# Patient Record
Sex: MALE | Employment: FULL TIME | URBAN - METROPOLITAN AREA
[De-identification: names, ages, dates, MRNs, and addresses within clinical notes are randomized per-mention and may not be internally consistent; named-entity substitution may affect disease eponyms.]

---

## 2024-01-17 ENCOUNTER — EVALUATION (OUTPATIENT)
Dept: PHYSICAL THERAPY | Facility: CLINIC | Age: 34
End: 2024-01-17
Payer: OTHER MISCELLANEOUS

## 2024-01-17 DIAGNOSIS — Z98.890 S/P LUMBAR DISCECTOMY: Primary | ICD-10-CM

## 2024-01-17 DIAGNOSIS — G89.29 CHRONIC BILATERAL LOW BACK PAIN WITH LEFT-SIDED SCIATICA: ICD-10-CM

## 2024-01-17 DIAGNOSIS — M54.42 CHRONIC BILATERAL LOW BACK PAIN WITH LEFT-SIDED SCIATICA: ICD-10-CM

## 2024-01-17 PROCEDURE — 97112 NEUROMUSCULAR REEDUCATION: CPT | Performed by: PHYSICAL THERAPIST

## 2024-01-17 PROCEDURE — 97110 THERAPEUTIC EXERCISES: CPT | Performed by: PHYSICAL THERAPIST

## 2024-01-17 PROCEDURE — 97161 PT EVAL LOW COMPLEX 20 MIN: CPT | Performed by: PHYSICAL THERAPIST

## 2024-01-17 RX ORDER — GABAPENTIN 300 MG/1
300 CAPSULE ORAL 3 TIMES DAILY
COMMUNITY

## 2024-01-17 RX ORDER — ACETAMINOPHEN 500 MG
500 TABLET ORAL EVERY 6 HOURS PRN
COMMUNITY

## 2024-01-17 NOTE — LETTER
2024    Carmelo Ya PA-C  [address] [address]    Patient: Gavin Morgan   YOB: 1990   Date of Visit: 2024     Encounter Diagnosis     ICD-10-CM    1. S/P lumbar discectomy  Z98.890       2. Chronic bilateral low back pain with left-sided sciatica  M54.42     G89.29           Dear Dr. Ya:    Thank you for your recent referral of Gavin Morgan. Please review the attached evaluation summary from Gavin's recent visit.     Please verify that you agree with the plan of care by signing the attached order.     If you have any questions or concerns, please do not hesitate to call.     I sincerely appreciate the opportunity to share in the care of one of your patients and hope to have another opportunity to work with you in the near future.       Sincerely,    Christie Garrett, PT      Referring Provider:      I certify that I have read the below Plan of Care and certify the need for these services furnished under this plan of treatment while under my care.                    Carmelo Ya PA-C  [address] [address]  Via Fax: [phone]          PT Evaluation     Today's date: 2024  Patient name: Gavin Morgan  : 1990  MRN: [id number]  Referring provider: Carmelo Ya PA-C  Dx:   Encounter Diagnosis     ICD-10-CM    1. S/P lumbar discectomy  Z98.890       2. Chronic bilateral low back pain with left-sided sciatica  M54.42     G89.29                      Assessment  Assessment details: Patient is a 33-year-old male status post L5-S1 discectomy in 2023.  Patient presents with decreased functional mobility due to increased pain, decreased hip and core strength, decreased lumbar ROM associated with aforementioned surgical procedure.  He remains out of work with restrictions and has not returned to recreational activity.  Patient will benefit from skilled  physical therapy to address impairment and improve functional mobility.  PT needed to allow for return to maximal function and improve quality of life.   Impairments: abnormal or restricted ROM, activity intolerance, impaired physical strength, lacks appropriate home exercise program and pain with function  Understanding of Dx/Px/POC: good   Prognosis: good    Goals  STG within 4 weeks:   1. Patient to be independent in HEP.   2. Reduce pain by 50% to improve quality of life.   3.  Improve left hip strength to 5 out of 5 in all planes.  LTG within 8 weeks:   1. Patient to be independent in ADLs/IADLs without difficulty.  2.  Patient to exhibit proper squat technique without pain.  3.  Patient to meet predicted Foto score.    Plan  Patient would benefit from: skilled physical therapy and PT eval  Planned modality interventions: cryotherapy, hydrotherapy and unattended electrical stimulation  Planned therapy interventions: therapeutic training, therapeutic exercise, therapeutic activities, stretching, strengthening, postural training, patient education, neuromuscular re-education, manual therapy, joint mobilization, IADL retraining, activity modification, ADL retraining, ADL training, body mechanics training, flexibility, functional ROM exercises, gait training, graded activity, graded exercise, graded motor and home exercise program  Frequency: 2x week  Duration in weeks: 8  Plan of Care beginning date: 1/17/2024  Plan of Care expiration date: 3/13/2024  Treatment plan discussed with: patient        Subjective Evaluation    History of Present Illness  Mechanism of injury: Patient is a 32 y/o s/p L L5/S1 disectomy on 11/7/23.  He had a work injury in late spring 2023 from lifting. Following a few epidurals without relief, he was scheduled for surgery.  He arrives today for post-op evaluation.  He saw his surgeon on 11/30/23 and was advised to begin therapy after 12/21/23.   Patient reports N/T down left leg has  "improved following surgery.  He denies any weakness. He is referred for evaluation and treatment.           Not a recurrent problem   Patient Goals  Patient goal: \"To get better and get back to my routine.\"  Pain  At best pain ratin  At worst pain ratin  Quality: tight  Relieving factors: rest  Exacerbated by: foot ROM/ankle DF; unable to lift; sitting for a period of time; hunching over.  Progression: improved    Social Support  Steps to enter house: yes  Stairs in house: yes   Lives in: multiple-level home    Employment status: not working ( (not currently working; out on work comp))  Exercise history: patient is active outdoors (dirt bike, fishing); not currently participating in these activities      Diagnostic Tests  Abnormal MRI: MRI before surgery showed L5/S1 herniation; small herniation at L4/5.  Treatments  Previous treatment: injection treatment  Current treatment: medication        Objective     Concurrent Complaints  Negative for bladder dysfunction, bowel dysfunction and saddle (S4) numbness    Active Range of Motion     Lumbar   Flexion: Active lumbar flexion: not tested 2* to post op.   Extension:  Restriction level: moderate  Left lateral flexion:  Restriction level: minimal  Right lateral flexion:  Restriction level: minimal    Strength/Myotome Testing     Lumbar     Right   Normal strength    Left Hip   Planes of Motion   Flexion: 4+  Extension: 4+    Left Knee   Flexion: 5  Extension: 5    Left Ankle/Foot   Dorsiflexion: 5  Great toe extension: 5    Tests     Lumbar     Left   Positive passive SLR.     Additional Tests Details  + neural tension LLE              Precautions: L5/S1 lumbar disectomy 2023     Increased time spent on patient education with diagnosis, prognosis, goals of therapy, progression of therapy, and plan of care.  All questions answered. Patient instructed to call clinic with questions or concerns.     POC expires Unit limit Auth Expiration " "date PT/OT/ST + Visit Limit?   3/13/24 BOMN 12/31/24 BOMN                               [email or web address]  Access Code: [email or web address]    Manuals 1/17                                                                Neuro Re-Ed             TA 3\"x20             Adductor set 3\"X20             Prone hip ext X10 ea            Paloff press NV            Standing hip abd/ext NV                                      Ther Ex             Pt edu- inc restrictions (BLT)  KS            Bike  NV             Supine sciatic nerve glide- LLE X20             Prone quad stretch w strap  5x20\" ea                                                                Ther Activity             Lifting technique Pt Edu             Mini squat NV             Gait Training                                       Modalities                                                            "

## 2024-01-17 NOTE — PROGRESS NOTES
PT Evaluation     Today's date: 2024  Patient name: Gavin Morgan  : 1990  MRN: [id number]  Referring provider: Carmelo Ya PA-C  Dx:   Encounter Diagnosis     ICD-10-CM    1. S/P lumbar discectomy  Z98.890       2. Chronic bilateral low back pain with left-sided sciatica  M54.42     G89.29                      Assessment  Assessment details: Patient is a 33-year-old male status post L5-S1 discectomy in 2023.  Patient presents with decreased functional mobility due to increased pain, decreased hip and core strength, decreased lumbar ROM associated with aforementioned surgical procedure.  He remains out of work with restrictions and has not returned to recreational activity.  Patient will benefit from skilled physical therapy to address impairment and improve functional mobility.  PT needed to allow for return to maximal function and improve quality of life.   Impairments: abnormal or restricted ROM, activity intolerance, impaired physical strength, lacks appropriate home exercise program and pain with function  Understanding of Dx/Px/POC: good   Prognosis: good    Goals  STG within 4 weeks:   1. Patient to be independent in HEP.   2. Reduce pain by 50% to improve quality of life.   3.  Improve left hip strength to 5 out of 5 in all planes.  LTG within 8 weeks:   1. Patient to be independent in ADLs/IADLs without difficulty.  2.  Patient to exhibit proper squat technique without pain.  3.  Patient to meet predicted Foto score.    Plan  Patient would benefit from: skilled physical therapy and PT eval  Planned modality interventions: cryotherapy, hydrotherapy and unattended electrical stimulation  Planned therapy interventions: therapeutic training, therapeutic exercise, therapeutic activities, stretching, strengthening, postural training, patient education, neuromuscular re-education, manual therapy, joint mobilization, IADL retraining, activity modification, ADL retraining, ADL training,  "body mechanics training, flexibility, functional ROM exercises, gait training, graded activity, graded exercise, graded motor and home exercise program  Frequency: 2x week  Duration in weeks: 8  Plan of Care beginning date: 2024  Plan of Care expiration date: 3/13/2024  Treatment plan discussed with: patient        Subjective Evaluation    History of Present Illness  Mechanism of injury: Patient is a 32 y/o s/p L L5/S1 disectomy on 23.  He had a work injury in late spring 2023 from lifting. Following a few epidurals without relief, he was scheduled for surgery.  He arrives today for post-op evaluation.  He saw his surgeon on 23 and was advised to begin therapy after 23.   Patient reports N/T down left leg has improved following surgery.  He denies any weakness. He is referred for evaluation and treatment.           Not a recurrent problem   Patient Goals  Patient goal: \"To get better and get back to my routine.\"  Pain  At best pain ratin  At worst pain ratin  Quality: tight  Relieving factors: rest  Exacerbated by: foot ROM/ankle DF; unable to lift; sitting for a period of time; hunching over.  Progression: improved    Social Support  Steps to enter house: yes  Stairs in house: yes   Lives in: multiple-level home    Employment status: not working ( (not currently working; out on work comp))  Exercise history: patient is active outdoors (dirt bike, fishing); not currently participating in these activities      Diagnostic Tests  Abnormal MRI: MRI before surgery showed L5/S1 herniation; small herniation at L4/5.  Treatments  Previous treatment: injection treatment  Current treatment: medication        Objective     Concurrent Complaints  Negative for bladder dysfunction, bowel dysfunction and saddle (S4) numbness    Active Range of Motion     Lumbar   Flexion: Active lumbar flexion: not tested 2* to post op.   Extension:  Restriction level: moderate  Left lateral " "flexion:  Restriction level: minimal  Right lateral flexion:  Restriction level: minimal    Strength/Myotome Testing     Lumbar     Right   Normal strength    Left Hip   Planes of Motion   Flexion: 4+  Extension: 4+    Left Knee   Flexion: 5  Extension: 5    Left Ankle/Foot   Dorsiflexion: 5  Great toe extension: 5    Tests     Lumbar     Left   Positive passive SLR.     Additional Tests Details  + neural tension LLE              Precautions: L5/S1 lumbar disectomy Nov. 2023     Increased time spent on patient education with diagnosis, prognosis, goals of therapy, progression of therapy, and plan of care.  All questions answered. Patient instructed to call clinic with questions or concerns.     POC expires Unit limit Auth Expiration date PT/OT/ST + Visit Limit?   3/13/24 BOMN 12/31/24 BOMN                               [email or web address]  Access Code: [email or web address]    Manuals 1/17                                                                Neuro Re-Ed             TA 3\"x20             Adductor set 3\"X20             Prone hip ext X10 ea            Paloff press NV            Standing hip abd/ext NV                                      Ther Ex             Pt edu- inc restrictions (BLT)  KS            Bike  NV             Supine sciatic nerve glide- LLE X20             Prone quad stretch w strap  5x20\" ea                                                                Ther Activity             Lifting technique Pt Edu             Mini squat NV             Gait Training                                       Modalities                                            "

## 2024-01-19 ENCOUNTER — OFFICE VISIT (OUTPATIENT)
Dept: PHYSICAL THERAPY | Facility: CLINIC | Age: 34
End: 2024-01-19
Payer: OTHER MISCELLANEOUS

## 2024-01-19 DIAGNOSIS — G89.29 CHRONIC BILATERAL LOW BACK PAIN WITH LEFT-SIDED SCIATICA: Primary | ICD-10-CM

## 2024-01-19 DIAGNOSIS — M54.42 CHRONIC BILATERAL LOW BACK PAIN WITH LEFT-SIDED SCIATICA: Primary | ICD-10-CM

## 2024-01-19 DIAGNOSIS — Z98.890 S/P LUMBAR DISCECTOMY: ICD-10-CM

## 2024-01-19 PROCEDURE — 97110 THERAPEUTIC EXERCISES: CPT | Performed by: PHYSICAL THERAPIST

## 2024-01-19 PROCEDURE — 97112 NEUROMUSCULAR REEDUCATION: CPT | Performed by: PHYSICAL THERAPIST

## 2024-01-19 NOTE — PROGRESS NOTES
"Daily Note     Today's date: 2024  Patient name: Gavin Morgan  : 1990  MRN: [id number]  Referring provider: Carmelo Ya PA-C  Dx:   Encounter Diagnosis     ICD-10-CM    1. Chronic bilateral low back pain with left-sided sciatica  M54.42     G89.29       2. S/P lumbar discectomy  Z98.890                      Subjective: Patient has no new complaints since initial visit.        Objective: See treatment diary below      Assessment: Tolerated treatment well. He was able to progress exercises to include recumbent bike, hip abd/ext, side step, SLR, SL hip abd.  Trial of mini squat yieleded pain, so this was held.  Will continue to progress core strengthening, as tolerated. Patient would benefit from continued PT. No complaints post session; he declines ice.       Plan: Continue per plan of care.      Precautions: L5/S1 lumbar disectomy 2023         POC expires Unit limit Auth Expiration date PT/OT/ST + Visit Limit?   3/13/24 BOMN 24 BOMN                               [email or web address]  Access Code: [email or web address]    Manuals                                                                 Neuro Re-Ed             TA 3\"x20             Adductor set 3\"X20  W TA & knee lift 3\"x20            Prone hip ext X10 ea 2# 2x10 ea            Paloff press NV Red x20 ea            Standing hip abd/ext NV Red 2x10 ea                                      Ther Ex             Pt edu- inc restrictions (BLT)  KS KS           Bike  NV  9 min            Supine sciatic nerve glide- LLE X20  x20           Prone quad stretch w strap  5x20\" ea 5x20\" ea            SLR   2# 2x10            SL hip abd   2# 2x10            Instruction in wall calf stretch   KS           HS stretch w strap   3x20\" ea           Ther Activity             Lifting technique Pt Edu             Mini squat NV  Trial; Pain            Gait Training                                       Modalities                                            "

## 2024-01-22 ENCOUNTER — OFFICE VISIT (OUTPATIENT)
Dept: PHYSICAL THERAPY | Facility: CLINIC | Age: 34
End: 2024-01-22
Payer: OTHER MISCELLANEOUS

## 2024-01-22 DIAGNOSIS — M54.42 CHRONIC BILATERAL LOW BACK PAIN WITH LEFT-SIDED SCIATICA: Primary | ICD-10-CM

## 2024-01-22 DIAGNOSIS — G89.29 CHRONIC BILATERAL LOW BACK PAIN WITH LEFT-SIDED SCIATICA: Primary | ICD-10-CM

## 2024-01-22 DIAGNOSIS — Z98.890 S/P LUMBAR DISCECTOMY: ICD-10-CM

## 2024-01-22 PROCEDURE — 97112 NEUROMUSCULAR REEDUCATION: CPT | Performed by: PHYSICAL THERAPIST

## 2024-01-22 PROCEDURE — 97110 THERAPEUTIC EXERCISES: CPT | Performed by: PHYSICAL THERAPIST

## 2024-01-22 NOTE — PROGRESS NOTES
"Daily Note     Today's date: 2024  Patient name: Gavin Morgan  : 1990  MRN: [id number]  Referring provider: Carmelo Ya PA-C  Dx:   Encounter Diagnosis     ICD-10-CM    1. Chronic bilateral low back pain with left-sided sciatica  M54.42     G89.29       2. S/P lumbar discectomy  Z98.890                      Subjective: Patient states he felt like he had worked his muscles after last visit.        Objective: See treatment diary below      Assessment: Tolerated treatment well. Continued progression of exercise program this visit. No issues during or post session.  He's able to add sit to stand with 10lb KB and DKTC PB roll into bridge.  Bird dogs and piriformis stretch added to HEP. Patient would benefit from continued PT.       Plan: Continue per plan of care.      Precautions: L5/S1 lumbar disectomy 2023         POC expires Unit limit Auth Expiration date PT/OT/ST + Visit Limit?   3/13/24 BOMN 24 BOMN                               [email or web address]  Access Code: [email or web address]    Manuals                                                               Neuro Re-Ed             TA 3\"x20             Adductor set 3\"X20  W TA & knee lift 3\"x20            Prone hip ext X10 ea 2# 2x10 ea  2# 3x10 ea          Paloff press NV Red x20 ea  Green x20 ea           Standing hip abd/ext NV Red 2x10 ea  Home           Bird dogs    X20                        Ther Ex             Pt edu- inc restrictions (BLT)  KS KS KS          Bike  NV  9 min  10 min          Supine sciatic nerve glide- LLE X20  x20 X20           Prone quad stretch w strap  5x20\" ea 5x20\" ea            SLR   2# 2x10  2# 3x10 ea           SL hip abd   2# 2x10  2# 3x10           Instruction in wall calf stretch   KS           HS stretch w strap   3x20\" ea 5x20\" ea           Piriformis stretch    5x20\" ea           Sit to stand w 10# KB    24\" box 2x10           Ther Activity             Lifting technique Pt Edu           "   Mini squat NV  Trial; Pain                                      Gait Training                                       Modalities

## 2024-01-25 ENCOUNTER — OFFICE VISIT (OUTPATIENT)
Dept: PHYSICAL THERAPY | Facility: CLINIC | Age: 34
End: 2024-01-25
Payer: OTHER MISCELLANEOUS

## 2024-01-25 DIAGNOSIS — M54.42 CHRONIC BILATERAL LOW BACK PAIN WITH LEFT-SIDED SCIATICA: Primary | ICD-10-CM

## 2024-01-25 DIAGNOSIS — Z98.890 S/P LUMBAR DISCECTOMY: ICD-10-CM

## 2024-01-25 DIAGNOSIS — G89.29 CHRONIC BILATERAL LOW BACK PAIN WITH LEFT-SIDED SCIATICA: Primary | ICD-10-CM

## 2024-01-25 PROCEDURE — 97110 THERAPEUTIC EXERCISES: CPT | Performed by: PHYSICAL THERAPIST

## 2024-01-25 PROCEDURE — 97112 NEUROMUSCULAR REEDUCATION: CPT | Performed by: PHYSICAL THERAPIST

## 2024-01-25 NOTE — PROGRESS NOTES
"Daily Note     Today's date: 2024  Patient name: Gavin Morgan  : 1990  MRN: [id number]  Referring provider: Carmelo Ya PA-C  Dx:   Encounter Diagnosis     ICD-10-CM    1. Chronic bilateral low back pain with left-sided sciatica  M54.42     G89.29       2. S/P lumbar discectomy  Z98.890                      Subjective: Patient states he had some LLE radiating symptoms when lying on his back to sleep.  States this eventually went away.  Denies any increase in activity besides walking his dog.       Objective: See treatment diary below      Assessment: Tolerated treatment well. Exercises progressed to dead bug and kneeling hip flexor stretch; updated his written HEP.  Advised patient to trial exercise to relieve symptoms if he has radicular pain at night. Also advised that he could trial pillow under his knees with sleep.   Patient would benefit from continued PT.  No complaints during or post session.       Plan: Continue per plan of care.      Precautions: L5/S1 lumbar disectomy 2023         POC expires Unit limit Auth Expiration date PT/OT/ST + Visit Limit?   3/13/24 BOMN 24 BOMN                               [email or web address]  Access Code: [email or web address]    Manuals                                                              Neuro Re-Ed             TA 3\"x20             Adductor set 3\"X20  W TA & knee lift 3\"x20   2# W TA & knee lift 3\"x20          Prone hip ext X10 ea 2# 2x10 ea  2# 3x10 ea 2# 2x10          Paloff press NV Red x20 ea  Green x20 ea  Green x20 ea          Standing hip abd/ext NV Red 2x10 ea  Home  Green 2x10 ea          Bird dogs    X20  X20          Dead bugs     2x10         Ther Ex             Pt edu- inc restrictions (BLT)  KS KS KS KS         Bike  NV  9 min  10 min Lv 5 x10 min         Supine sciatic nerve glide- LLE X20  x20 X20           Prone quad stretch w strap  5x20\" ea 5x20\" ea            SLR   2# 2x10  2# 3x10 ea  2# 2x10          SL " "hip abd   2# 2x10  2# 3x10  2# 2x10          Instruction in wall calf stretch   KS           HS stretch w strap   3x20\" ea 5x20\" ea  By PT          Piriformis stretch    5x20\" ea  By PT          Sit to stand w 10# KB    24\" box 2x10  24\" box 2x10          Kneeling hip flexor stretch     5x20\" ea         Ther Activity             Lifting technique Pt Edu             Mini squat NV  Trial; Pain                                      Gait Training                                       Modalities                                            "

## 2024-01-29 ENCOUNTER — OFFICE VISIT (OUTPATIENT)
Dept: PHYSICAL THERAPY | Facility: CLINIC | Age: 34
End: 2024-01-29
Payer: OTHER MISCELLANEOUS

## 2024-01-29 DIAGNOSIS — M54.42 CHRONIC BILATERAL LOW BACK PAIN WITH LEFT-SIDED SCIATICA: Primary | ICD-10-CM

## 2024-01-29 DIAGNOSIS — G89.29 CHRONIC BILATERAL LOW BACK PAIN WITH LEFT-SIDED SCIATICA: Primary | ICD-10-CM

## 2024-01-29 DIAGNOSIS — Z98.890 S/P LUMBAR DISCECTOMY: ICD-10-CM

## 2024-01-29 PROCEDURE — 97112 NEUROMUSCULAR REEDUCATION: CPT

## 2024-01-29 PROCEDURE — 97110 THERAPEUTIC EXERCISES: CPT

## 2024-01-29 NOTE — PROGRESS NOTES
"Daily Note     Today's date: 2024  Patient name: Gavin Morgan  : 1990  MRN: [id number]  Referring provider: Carmelo Ya PA-C  Dx:   Encounter Diagnosis     ICD-10-CM    1. Chronic bilateral low back pain with left-sided sciatica  M54.42     G89.29       2. S/P lumbar discectomy  Z98.890                      Subjective: Pt reports he slipped on water on stairs over the weekend and landed on his back and RUE.  He denies LBP and radicular sx upon arrival and reports he performed HEP over the weekend without any complications.        Objective: See treatment diary below      Assessment: After discussing case with supervising PT, GM, PT, we will continue with PT treatment today.  Patient is able to complete all charted interventions without complications and pain.  He demonstrates decreased motor control of glutes and core stabilizers.  No progressions were made due to recent incident.  Will progress NV as able.      Plan: Continue per plan of care.      Precautions: L5/S1 lumbar disectomy 2023         POC expires Unit limit Auth Expiration date PT/OT/ST + Visit Limit?   3/13/24 BOMN 24 BOMN                               [email or web address]  Access Code: [email or web address]    FOTO     63/66        Manuals                                                             Neuro Re-Ed             TA 3\"x20             Adductor set 3\"X20  W TA & knee lift 3\"x20   2# W TA & knee lift 3\"x20  2# c TA & knee lift 3\"x20        Prone hip ext X10 ea 2# 2x10 ea  2# 3x10 ea 2# 2x10  2# 2x10        Paloff press NV Red x20 ea  Green x20 ea  Green x20 ea  GTB x20        Standing hip abd/ext NV Red 2x10 ea  Home  Green 2x10 ea          Bird dogs    X20  X20  x20        Dead bugs     2x10 2x10        Ther Ex             Pt edu- inc restrictions (BLT)  KS KS KS KS AF        Bike  NV  9 min  10 min Lv 5 x10 min L5 x10 min        Supine sciatic nerve glide- LLE X20  x20 X20           Prone quad " "stretch w strap  5x20\" ea 5x20\" ea            SLR   2# 2x10  2# 3x10 ea  2# 2x10  2# 2x10        SL hip abd   2# 2x10  2# 3x10  2# 2x10  2# 2x10        Instruction in wall calf stretch   KS           HS stretch w strap   3x20\" ea 5x20\" ea  By PT  manual        Piriformis stretch    5x20\" ea  By PT  manual        Sit to stand w 10# KB    24\" box 2x10  24\" box 2x10  20\" box 2x10        Kneeling hip flexor stretch     5x20\" ea         Ther Activity             Lifting technique Pt Edu             Mini squat NV  Trial; Pain                                      Gait Training                                       Modalities                                              "

## 2024-02-01 ENCOUNTER — OFFICE VISIT (OUTPATIENT)
Dept: PHYSICAL THERAPY | Facility: CLINIC | Age: 34
End: 2024-02-01
Payer: OTHER MISCELLANEOUS

## 2024-02-01 DIAGNOSIS — G89.29 CHRONIC BILATERAL LOW BACK PAIN WITH LEFT-SIDED SCIATICA: Primary | ICD-10-CM

## 2024-02-01 DIAGNOSIS — M54.42 CHRONIC BILATERAL LOW BACK PAIN WITH LEFT-SIDED SCIATICA: Primary | ICD-10-CM

## 2024-02-01 DIAGNOSIS — Z98.890 S/P LUMBAR DISCECTOMY: ICD-10-CM

## 2024-02-01 PROCEDURE — 97530 THERAPEUTIC ACTIVITIES: CPT | Performed by: PHYSICAL THERAPIST

## 2024-02-01 PROCEDURE — 97112 NEUROMUSCULAR REEDUCATION: CPT | Performed by: PHYSICAL THERAPIST

## 2024-02-01 PROCEDURE — 97110 THERAPEUTIC EXERCISES: CPT | Performed by: PHYSICAL THERAPIST

## 2024-02-01 NOTE — PROGRESS NOTES
"Daily Note     Today's date: 2024  Patient name: Gavin Morgan  : 1990  MRN: [id number]  Referring provider: Carmelo Ya PA-C  Dx:   Encounter Diagnosis     ICD-10-CM    1. Chronic bilateral low back pain with left-sided sciatica  M54.42     G89.29       2. S/P lumbar discectomy  Z98.890                      Subjective: Pt denies any increased back or radicular pain following his fall before last visit.       Objective: See treatment diary below      Assessment: Educated patient on correct squat technique. Advised against forward bend; instead discussed golfer's , squatting, or dead lift posture.  Patient has pain with dead lift posture and is unable to perform without weight without pain.  Instead, performed RDL progression with retro-toe tap. No pain during this and advised that he could add this to HEP.       Plan: Continue per plan of care.      Precautions: L5/S1 lumbar disectomy 2023         POC expires Unit limit Auth Expiration date PT/OT/ST + Visit Limit?   3/13/24 BOMN 24 BOMN                               [email or web address]  Access Code: [email or web address]    FOTO     63/66        Manuals                                                            Neuro Re-Ed             TA 3\"x20             Adductor set 3\"X20  W TA & knee lift 3\"x20   2# W TA & knee lift 3\"x20  2# c TA & knee lift 3\"x20 2# w TA & knee lift 3\"X20        Prone hip ext X10 ea 2# 2x10 ea  2# 3x10 ea 2# 2x10  2# 2x10 2# 2x10        Paloff press NV Red x20 ea  Green x20 ea  Green x20 ea  GTB x20 Green 2x10 ea       Standing hip abd/ext NV Red 2x10 ea  Home  Green 2x10 ea   Green 2x10 ea       Bird dogs    X20  X20  x20 2# on LE x20        Dead bugs     2x10 2x10 2# on LE 2x10        Ther Ex             Pt edu- inc restrictions (BLT)  KS KS KS KS AF KS       Bike  NV  9 min  10 min Lv 5 x10 min L5 x10 min L5 x 10 min       Supine sciatic nerve glide- LLE X20  x20 X20           Prone " "quad stretch w strap  5x20\" ea 5x20\" ea            SLR   2# 2x10  2# 3x10 ea  2# 2x10  2# 2x10 2# 2x10        SL hip abd   2# 2x10  2# 3x10  2# 2x10  2# 2x10 2# 2x10        Instruction in wall calf stretch   KS    5x20\" ea        HS stretch w strap   3x20\" ea 5x20\" ea  By PT  manual        Piriformis stretch    5x20\" ea  By PT  manual        Sit to stand w 10# KB    24\" box 2x10  24\" box 2x10  20\" box 2x10 Squat to box  2x10        Kneeling hip flexor stretch     5x20\" ea  5x20\" ea        Ther Activity             Lifting technique Pt Edu      KS       Mini squat NV  Trial; Pain     Wall ball squat 2x10        TRX squat       2x10        RDL progression- retro toe tap       2x10 ea       Gait Training                                       Modalities                                              "

## 2024-02-05 ENCOUNTER — OFFICE VISIT (OUTPATIENT)
Dept: PHYSICAL THERAPY | Facility: CLINIC | Age: 34
End: 2024-02-05
Payer: OTHER MISCELLANEOUS

## 2024-02-05 DIAGNOSIS — M54.42 CHRONIC BILATERAL LOW BACK PAIN WITH LEFT-SIDED SCIATICA: Primary | ICD-10-CM

## 2024-02-05 DIAGNOSIS — G89.29 CHRONIC BILATERAL LOW BACK PAIN WITH LEFT-SIDED SCIATICA: Primary | ICD-10-CM

## 2024-02-05 DIAGNOSIS — Z98.890 S/P LUMBAR DISCECTOMY: ICD-10-CM

## 2024-02-05 PROCEDURE — 97110 THERAPEUTIC EXERCISES: CPT

## 2024-02-05 PROCEDURE — 97112 NEUROMUSCULAR REEDUCATION: CPT

## 2024-02-05 NOTE — PROGRESS NOTES
"Daily Note     Today's date: 2024  Patient name: Gavin Morgan  : 1990  MRN: [id number]  Referring provider: Carmelo Ya PA-C  Dx:   Encounter Diagnosis     ICD-10-CM    1. Chronic bilateral low back pain with left-sided sciatica  M54.42     G89.29       2. S/P lumbar discectomy  Z98.890                      Subjective: Patient reports he has not been feeling great over the last few days.  He reports he had pain 2 days ago in low back, which reminded him of the same pain he felt prior to surgery.  He reports by yesterday, he had pain into his L glute and hamstring regions and had difficulty with walking secondary to pain.  Today, he reports that pain has improved but still reports discomfort in posterior thigh with walking.      Objective: See treatment diary below      Assessment: Pt was assessed by KS, PT prior to initiation of treatment.  Positive neural tension on LLE.  Patient with fair TA activation and requires VC to avoid valsalva.  He is able to activate appropriately following cuing.  He reports an improvement in sx following initiation of treatment and denies pain post session.  Pt education provided on performing light HEP (as we performed today) and icing at home.  He demonstrates verbal understanding.        Plan: Continue per plan of care.      Precautions: L5/S1 lumbar disectomy 2023         POC expires Unit limit Auth Expiration date PT/OT/ST + Visit Limit?   3/13/24 BOMN 24 BOMN                               [email or web address]  Access Code: [email or web address]    FOTO     63/66        Manuals                                                           Neuro Re-Ed             TA 3\"x20       5\"x20, & c march x20      Adductor set 3\"X20  W TA & knee lift 3\"x20   2# W TA & knee lift 3\"x20  2# c TA & knee lift 3\"x20 2# w TA & knee lift 3\"X20  C pillow 3\"x20      Prone hip ext X10 ea 2# 2x10 ea  2# 3x10 ea 2# 2x10  2# 2x10 2# 2x10        Paloff press " "NV Red x20 ea  Green x20 ea  Green x20 ea  GTB x20 Green 2x10 ea       Standing hip abd/ext NV Red 2x10 ea  Home  Green 2x10 ea   Green 2x10 ea       Bird dogs    X20  X20  x20 2# on LE x20        Dead bugs     2x10 2x10 2# on LE 2x10        Ther Ex             Pt edu- inc restrictions (BLT)  KS KS KS KS AF KS AF      Bike  NV  9 min  10 min Lv 5 x10 min L5 x10 min L5 x 10 min 5'       Supine sciatic nerve glide- LLE X20  x20 X20           Prone quad stretch w strap  5x20\" ea 5x20\" ea            SLR   2# 2x10  2# 3x10 ea  2# 2x10  2# 2x10 2# 2x10        SL hip abd   2# 2x10  2# 3x10  2# 2x10  2# 2x10 2# 2x10        Instruction in wall calf stretch   KS    5x20\" ea        HS stretch w strap   3x20\" ea 5x20\" ea  By PT  manual        Piriformis stretch    5x20\" ea  By PT  manual        Sit to stand w 10# KB    24\" box 2x10  24\" box 2x10  20\" box 2x10 Squat to box  2x10        Kneeling hip flexor stretch     5x20\" ea  5x20\" ea        clamshells       10\"x10      bridge       2x10      Ther Activity             Lifting technique Pt Edu      KS       Mini squat NV  Trial; Pain     Wall ball squat 2x10        TRX squat       2x10        RDL progression- retro toe tap       2x10 ea       Gait Training                                       Modalities             CP low back - prone pillow under hips       10'                            "

## 2024-02-08 ENCOUNTER — OFFICE VISIT (OUTPATIENT)
Dept: PHYSICAL THERAPY | Facility: CLINIC | Age: 34
End: 2024-02-08
Payer: OTHER MISCELLANEOUS

## 2024-02-08 DIAGNOSIS — Z98.890 S/P LUMBAR DISCECTOMY: ICD-10-CM

## 2024-02-08 DIAGNOSIS — G89.29 CHRONIC BILATERAL LOW BACK PAIN WITH LEFT-SIDED SCIATICA: Primary | ICD-10-CM

## 2024-02-08 DIAGNOSIS — M54.42 CHRONIC BILATERAL LOW BACK PAIN WITH LEFT-SIDED SCIATICA: Primary | ICD-10-CM

## 2024-02-08 PROCEDURE — 97110 THERAPEUTIC EXERCISES: CPT | Performed by: PHYSICAL THERAPIST

## 2024-02-08 PROCEDURE — 97112 NEUROMUSCULAR REEDUCATION: CPT | Performed by: PHYSICAL THERAPIST

## 2024-02-08 NOTE — PROGRESS NOTES
"Daily Note     Today's date: 2024  Patient name: Gavin Morgan  : 1990  MRN: [id number]  Referring provider: Carmelo Ya PA-C  Dx:   Encounter Diagnosis     ICD-10-CM    1. Chronic bilateral low back pain with left-sided sciatica  M54.42     G89.29       2. S/P lumbar discectomy  Z98.890                      Subjective: Patient states he feels better then he did last Saturday, but he still gets occasional pain into left glute/hamstring.  He states he hasn't been doing much at home so he is unsure why he is more painful.       Objective: See treatment diary below      Assessment: This visit, he does exhibit L post inn rot, corrected with manual mobilization, but no change in pain level following correction.  Palpation of thoracolumbar paraspinals shows no muscle spasming/tightness. Positive neural tension on LLE remains. No change in pain following a trial of prone press ups, so these were held. He's again able to complete lighter session and ends with ice. He does exhibit some difficulty engaging glutes during clamshells.  Advised to call his surgeon to see if they want him to try anything different before his appt next week with them. Otherwise, advised in not overdoing it at home, continued light exercise, and use of ice.        Plan: Continue per plan of care.      Precautions: L5/S1 lumbar disectomy 2023         POC expires Unit limit Auth Expiration date PT/OT/ST + Visit Limit?   3/13/24 BOMN 24 BOMN                               [email or web address]  Access Code: [email or web address]    FOTO     63/66        Manuals      L ant inn rot mob        KS                                            Neuro Re-Ed             TA 3\"x20       5\"x20, & c march x20 3\"x20      Adductor set 3\"X20  W TA & knee lift 3\"x20   2# W TA & knee lift 3\"x20  2# c TA & knee lift 3\"x20 2# w TA & knee lift 3\"X20  C pillow 3\"x20 3\"x20      Prone hip ext X10 ea 2# 2x10 ea  2# 3x10 ea " "2# 2x10  2# 2x10 2# 2x10   2x10 ea (pillow under hips)      Paloff press NV Red x20 ea  Green x20 ea  Green x20 ea  GTB x20 Green 2x10 ea       Standing hip abd/ext NV Red 2x10 ea  Home  Green 2x10 ea   Green 2x10 ea       Bird dogs    X20  X20  x20 2# on LE x20        Dead bugs     2x10 2x10 2# on LE 2x10        Ther Ex             Pt edu- inc restrictions (BLT)  KS KS KS KS AF KS AF KS     Bike  NV  9 min  10 min Lv 5 x10 min L5 x10 min L5 x 10 min 5'  8'      Supine sciatic nerve glide- LLE X20  x20 X20      L SAQ x20      Prone quad stretch w strap  5x20\" ea 5x20\" ea            SLR   2# 2x10  2# 3x10 ea  2# 2x10  2# 2x10 2# 2x10        SL hip abd   2# 2x10  2# 3x10  2# 2x10  2# 2x10 2# 2x10        Instruction in wall calf stretch   KS    5x20\" ea        HS stretch w strap   3x20\" ea 5x20\" ea  By PT  manual        Piriformis stretch    5x20\" ea  By PT  manual        Sit to stand w 10# KB    24\" box 2x10  24\" box 2x10  20\" box 2x10 Squat to box  2x10        Kneeling hip flexor stretch     5x20\" ea  5x20\" ea        clamshells       10\"x10 10\"X10      bridge       2x10      Prone press up         X10 trial; HOLD     Ther Activity             Lifting technique Pt Edu      KS       Mini squat NV  Trial; Pain     Wall ball squat 2x10        TRX squat       2x10        RDL progression- retro toe tap       2x10 ea       Gait Training                                       Modalities             CP low back - prone pillow under hips       10' 5'                           "

## 2024-02-12 ENCOUNTER — OFFICE VISIT (OUTPATIENT)
Dept: PHYSICAL THERAPY | Facility: CLINIC | Age: 34
End: 2024-02-12
Payer: OTHER MISCELLANEOUS

## 2024-02-12 DIAGNOSIS — G89.29 CHRONIC BILATERAL LOW BACK PAIN WITH LEFT-SIDED SCIATICA: Primary | ICD-10-CM

## 2024-02-12 DIAGNOSIS — Z98.890 S/P LUMBAR DISCECTOMY: ICD-10-CM

## 2024-02-12 DIAGNOSIS — M54.42 CHRONIC BILATERAL LOW BACK PAIN WITH LEFT-SIDED SCIATICA: Primary | ICD-10-CM

## 2024-02-12 PROCEDURE — 97112 NEUROMUSCULAR REEDUCATION: CPT | Performed by: PHYSICAL THERAPIST

## 2024-02-12 PROCEDURE — 97110 THERAPEUTIC EXERCISES: CPT | Performed by: PHYSICAL THERAPIST

## 2024-02-12 NOTE — PROGRESS NOTES
"Daily Note     Today's date: 2024  Patient name: Gavin Morgan  : 1990  MRN: [id number]  Referring provider: Carmelo Ya PA-C  Dx:   Encounter Diagnosis     ICD-10-CM    1. Chronic bilateral low back pain with left-sided sciatica  M54.42     G89.29       2. S/P lumbar discectomy  Z98.890                      Subjective: Patient states he had increased pain again Saturday and .  He states he felt sore, but fine leaving clinic after last appt. However, increased pain this weekend without any specific cause. He denies doing any increased activity.       Objective: See treatment diary below      Assessment: Patient with some radicular pain during paloff press, so he did not finish all his reps.  He then performed recumbent bike without pain.  The rest of his exercises performed at table included core and hip stabilization.  He had no pain during these exercises and leaves session without pain.        Plan: Continue per plan of care.      Precautions: L5/S1 lumbar disectomy 2023         POC expires Unit limit Auth Expiration date PT/OT/ST + Visit Limit?   3/13/24 BOMN 24 BOMN                               [facility]  Access Code: [email or web address]    FOTO     63/66        Manuals     L ant inn rot mob        KS                                            Neuro Re-Ed             TA 3\"x20       5\"x20, & c march x20 3\"x20      Adductor set 3\"X20  W TA & knee lift 3\"x20   2# W TA & knee lift 3\"x20  2# c TA & knee lift 3\"x20 2# w TA & knee lift 3\"X20  C pillow 3\"x20 3\"x20  W knee lift 3\"x20 w bridge x20    Prone hip ext X10 ea 2# 2x10 ea  2# 3x10 ea 2# 2x10  2# 2x10 2# 2x10   2x10 ea (pillow under hips)      Paloff press NV Red x20 ea  Green x20 ea  Green x20 ea  GTB x20 Green 2x10 ea   10# dara x15 ea    Standing hip abd/ext NV Red 2x10 ea  Home  Green 2x10 ea   Green 2x10 ea       Bird dogs    X20  X20  x20 2# on LE x20        Dead bugs     " "2x10 2x10 2# on LE 2x10        Stand PB push         3\"x20    HL hip abd w TA          Red 3x10 ea                  Ther Ex             Pt edu- inc restrictions (BLT)  KS KS KS KS AF KS AF KS KS    Bike for ROM w lumbar roll  NV  9 min  10 min Lv 5 x10 min L5 x10 min L5 x 10 min 5'  8'  10'    Supine sciatic nerve glide- LLE X20  x20 X20      L SAQ x20      Prone quad stretch w strap  5x20\" ea 5x20\" ea            SLR   2# 2x10  2# 3x10 ea  2# 2x10  2# 2x10 2# 2x10        SL hip abd   2# 2x10  2# 3x10  2# 2x10  2# 2x10 2# 2x10        Instruction in wall calf stretch   KS    5x20\" ea        B HS stretch w strap   3x20\" ea 5x20\" ea  By PT  manual    By PT    B Piriformis stretch    5x20\" ea  By PT  manual    By PT    B Adductor stretch by PT          By PT     Sit to stand w 10# KB    24\" box 2x10  24\" box 2x10  20\" box 2x10 Squat to box  2x10        Kneeling hip flexor stretch     5x20\" ea  5x20\" ea        clamshells       10\"x10 10\"X10      bridge       2x10      Prone press up         X10 trial; HOLD     Ther Activity             Lifting technique Pt Edu      KS       Mini squat NV  Trial; Pain     Wall ball squat 2x10        TRX squat       2x10        RDL progression- retro toe tap       2x10 ea       Gait Training                                       Modalities             CP low back - prone pillow under hips       10' 5'                           "

## 2024-02-15 ENCOUNTER — APPOINTMENT (OUTPATIENT)
Dept: PHYSICAL THERAPY | Facility: CLINIC | Age: 34
End: 2024-02-15
Payer: OTHER MISCELLANEOUS

## 2024-02-16 ENCOUNTER — OFFICE VISIT (OUTPATIENT)
Dept: PHYSICAL THERAPY | Facility: CLINIC | Age: 34
End: 2024-02-16
Payer: OTHER MISCELLANEOUS

## 2024-02-16 DIAGNOSIS — G89.29 CHRONIC BILATERAL LOW BACK PAIN WITH LEFT-SIDED SCIATICA: Primary | ICD-10-CM

## 2024-02-16 DIAGNOSIS — M54.42 CHRONIC BILATERAL LOW BACK PAIN WITH LEFT-SIDED SCIATICA: Primary | ICD-10-CM

## 2024-02-16 DIAGNOSIS — Z98.890 S/P LUMBAR DISCECTOMY: ICD-10-CM

## 2024-02-16 PROCEDURE — 97140 MANUAL THERAPY 1/> REGIONS: CPT | Performed by: PHYSICAL THERAPIST

## 2024-02-16 PROCEDURE — 97014 ELECTRIC STIMULATION THERAPY: CPT | Performed by: PHYSICAL THERAPIST

## 2024-02-16 PROCEDURE — 97110 THERAPEUTIC EXERCISES: CPT | Performed by: PHYSICAL THERAPIST

## 2024-02-16 NOTE — PROGRESS NOTES
"Daily Note     Today's date: 2024  Patient name: Gavin Morgan  : 1990  MRN: [id number]  Referring provider: Carmelo Ya PA-C  Dx:   Encounter Diagnosis     ICD-10-CM    1. Chronic bilateral low back pain with left-sided sciatica  M54.42     G89.29       2. S/P lumbar discectomy  Z98.890                      Subjective: Patient states his pain comes and goes, but is worse when standing or walking. He reports it starts in the left glute and goes into the calf.  He states he saw the doctor yesterday, who is doing another MRI.       Objective: See treatment diary below      Assessment: Performance of STR with roller to left glute.  This seems to relieve his radicular symptoms, but he also reports less pain following a seated rest.  During session, he is shown self piriformis stretch and self foam roller to piriformis.  Session ends with TENS and hot pack in prone.  Upon getting up from table, he reports immediate radicular symptoms.  Advised to perform ice instead of heat to reduce inflammation.  Trial of right side glide, with initial relief, but following this radicular pain increased.  Ultimately radicular symptoms were only relieved with seated rest.  Advised patient to call referring physician to discuss whether PT should be continued and to discuss if any anti-inflammatory would be beneficial to patient.      Plan: Continue per plan of care.      Precautions: L5/S1 lumbar disectomy 2023         POC expires Unit limit Auth Expiration date PT/OT/ST + Visit Limit?   3/13/24 BOMN 24 BOMN                               [email or web address]  Access Code: [email or web address]    FOTO     63/66        Manuals    L ant inn rot mob        KS     STR w roller to left glute/piri & HS           10'                             Neuro Re-Ed             TA 3\"x20       5\"x20, & c march x20 3\"x20      Adductor set 3\"X20  W TA & knee lift 3\"x20   2# W TA & knee lift " "3\"x20  2# c TA & knee lift 3\"x20 2# w TA & knee lift 3\"X20  C pillow 3\"x20 3\"x20  W knee lift 3\"x20 w bridge x20    Prone hip ext X10 ea 2# 2x10 ea  2# 3x10 ea 2# 2x10  2# 2x10 2# 2x10   2x10 ea (pillow under hips)      Paloff press NV Red x20 ea  Green x20 ea  Green x20 ea  GTB x20 Green 2x10 ea   10# dara x15 ea Green 2x10 ea    Standing hip abd/ext NV Red 2x10 ea  Home  Green 2x10 ea   Green 2x10 ea       Bird dogs    X20  X20  x20 2# on LE x20        Dead bugs     2x10 2x10 2# on LE 2x10        Stand PB push         3\"x20    HL hip abd w TA          Red 3x10 ea                  Ther Ex             Pt edu- inc restrictions (BLT)  KS KS KS KS AF KS AF KS KS KS   Bike for ROM w lumbar roll  NV  9 min  10 min Lv 5 x10 min L5 x10 min L5 x 10 min 5'  8'  10' 10'    Supine sciatic nerve glide- LLE X20  x20 X20      L SAQ x20      Prone quad stretch w strap  5x20\" ea 5x20\" ea            SLR   2# 2x10  2# 3x10 ea  2# 2x10  2# 2x10 2# 2x10        SL hip abd   2# 2x10  2# 3x10  2# 2x10  2# 2x10 2# 2x10        Instruction in wall calf stretch   KS    5x20\" ea        B HS stretch w strap   3x20\" ea 5x20\" ea  By PT  manual    By PT    B Piriformis stretch    5x20\" ea  By PT  manual    By PT    B Adductor stretch by PT          By PT     Sit to stand w 10# KB    24\" box 2x10  24\" box 2x10  20\" box 2x10 Squat to box  2x10        Kneeling hip flexor stretch     5x20\" ea  5x20\" ea        clamshells       10\"x10 10\"X10      bridge       2x10      Prone press up         X10 trial; HOLD     Foam roller to L piri & glute           3 min    Seated piriformis stretch           5x20\"    R side glide against wall           X10 HOLD    Ther Activity             Lifting technique Pt Edu      KS       Mini squat NV  Trial; Pain     Wall ball squat 2x10        TRX squat       2x10        RDL progression- retro toe tap       2x10 ea       Gait Training                                       Modalities             CP low back - prone " pillow under hips       10' 5'     Hot pack- LSP & glute/HS           10'    TENS LSP           10'

## 2024-02-21 ENCOUNTER — APPOINTMENT (OUTPATIENT)
Dept: PHYSICAL THERAPY | Facility: CLINIC | Age: 34
End: 2024-02-21
Payer: OTHER MISCELLANEOUS

## 2024-02-27 ENCOUNTER — OFFICE VISIT (OUTPATIENT)
Dept: PHYSICAL THERAPY | Facility: CLINIC | Age: 34
End: 2024-02-27
Payer: OTHER MISCELLANEOUS

## 2024-02-27 DIAGNOSIS — Z98.890 S/P LUMBAR DISCECTOMY: ICD-10-CM

## 2024-02-27 DIAGNOSIS — G89.29 CHRONIC BILATERAL LOW BACK PAIN WITH LEFT-SIDED SCIATICA: Primary | ICD-10-CM

## 2024-02-27 DIAGNOSIS — M54.42 CHRONIC BILATERAL LOW BACK PAIN WITH LEFT-SIDED SCIATICA: Primary | ICD-10-CM

## 2024-02-27 PROCEDURE — 97110 THERAPEUTIC EXERCISES: CPT | Performed by: PHYSICAL THERAPIST

## 2024-02-27 PROCEDURE — 97140 MANUAL THERAPY 1/> REGIONS: CPT | Performed by: PHYSICAL THERAPIST

## 2024-02-27 PROCEDURE — 97112 NEUROMUSCULAR REEDUCATION: CPT | Performed by: PHYSICAL THERAPIST

## 2024-02-27 PROCEDURE — 97014 ELECTRIC STIMULATION THERAPY: CPT | Performed by: PHYSICAL THERAPIST

## 2024-02-28 NOTE — PROGRESS NOTES
"Daily Note     Today's date: 2024  Patient name: Gavin Morgan  : 1990  MRN: [id number]  Referring provider: Carmelo Ya PA-C  Dx:   Encounter Diagnosis     ICD-10-CM    1. Chronic bilateral low back pain with left-sided sciatica  M54.42     G89.29       2. S/P lumbar discectomy  Z98.890                      Subjective: Patient states he had MRI last week, but hasn't gotten results yet. States he's still having \"Sciatica\" any time he stands or walks, but his leg is \"less stiff.\"       Objective: See treatment diary below      Assessment: Continued performance of STR with roller to left glute, with relief. Treatment focuses on BLE stretching, core stabilization, and pain relief with use of ice/TENS.  Patient will discuss next course of treatment with surgeon. At this time, recommend at least another visit of PT to re-assess.  Will continue if deemed appropriate by physcian.       Plan: Continue per plan of care.      Precautions: L5/S1 lumbar disectomy 2023         POC expires Unit limit Auth Expiration date PT/OT/ST + Visit Limit?   3/13/24 BOMN 24 BOMN                               [email or web address]  Access Code: [email or web address]    FOTO     63/66        Manuals    L ant inn rot mob        KS     STR w roller to left glute/piri & HS  10'          10'                             Neuro Re-Ed             TA W DL PB push 3\"x20       5\"x20, & c march x20 3\"x20      Adductor set    2# W TA & knee lift 3\"x20  2# c TA & knee lift 3\"x20 2# w TA & knee lift 3\"X20  C pillow 3\"x20 3\"x20  W knee lift 3\"x20 w bridge x20    Prone hip ext   2# 3x10 ea 2# 2x10  2# 2x10 2# 2x10   2x10 ea (pillow under hips)      Paloff press   Green x20 ea  Green x20 ea  GTB x20 Green 2x10 ea   10# dara x15 ea Green 2x10 ea    Standing hip abd/ext   Home  Green 2x10 ea   Green 2x10 ea       Bird dogs  2x10 UE, x16 LE   X20  X20  x20 2# on LE x20        Dead bugs     2x10 2x10 " "2# on LE 2x10        Stand PB push         3\"x20    HL hip abd w TA          Red 3x10 ea                  Ther Ex             Pt edu KS  KS KS AF KS AF KS KS KS   Bike for ROM w lumbar roll  10'   10 min Lv 5 x10 min L5 x10 min L5 x 10 min 5'  8'  10' 10'    Supine sciatic nerve glide- LLE   X20      L SAQ x20      Prone quad stretch w strap  5x20\" ea            SLR    2# 3x10 ea  2# 2x10  2# 2x10 2# 2x10        SL hip abd    2# 3x10  2# 2x10  2# 2x10 2# 2x10        Instruction in wall calf stretch       5x20\" ea        B HS stretch w strap  5x20\" ea by PT  5x20\" ea  By PT  manual    By PT    B Piriformis stretch  5x20\" ea   5x20\" ea  By PT  manual    By PT    B Adductor stretch by PT          By PT     Sit to stand w 10# KB    24\" box 2x10  24\" box 2x10  20\" box 2x10 Squat to box  2x10        Kneeling hip flexor stretch     5x20\" ea  5x20\" ea        clamshells       10\"x10 10\"X10      bridge 2x10       2x10      Prone press up         X10 trial; HOLD     Foam roller to L piri & glute           3 min    Seated piriformis stretch           5x20\"    R side glide against wall           X10 HOLD    Ther Activity             Lifting technique      KS       Mini squat      Wall ball squat 2x10        TRX squat       2x10        RDL progression- retro toe tap       2x10 ea       Gait Training                                       Modalities             CP low back - prone pillow under hips 10'       10' 5'     Hot pack- LSP & glute/HS           10'    TENS LSP  10'          10'            "

## 2024-09-19 LAB
ALBUMIN SERPL-MCNC: 4.9 G/DL (ref 3.5–5)
ALP SERPL-CCNC: 72 U/L (ref 38–126)
ALT SERPL-CCNC: 31 U/L (ref 0–50)
AST SERPL-CCNC: 27 U/L (ref 17–59)
BUN SERPL-MCNC: 15 MG/DL (ref 9–20)
CALCIUM SERPL-MCNC: 9.7 MG/DL (ref 8.4–10.2)
CHLORIDE SERPL-SCNC: 102 MMOL/L (ref 98–107)
CO2 SERPL-SCNC: 27 MMOL/L (ref 22–30)
EGFR: > 60
ERYTHROCYTE [DISTWIDTH] IN BLOOD BY AUTOMATED COUNT: 11.3 % (ref 11.5–14.5)
ESTIMATED CREATININE CLEARANCE: > 125 ML/MIN
GLUCOSE SERPL-MCNC: 65 MG/DL (ref 70–99)
HCT VFR BLD AUTO: 43.9 % (ref 39–52)
HGB BLD-MCNC: 15.8 G/DL (ref 13–18)
MCHC RBC AUTO-ENTMCNC: 36 G/DL (ref 33–37)
MCV RBC AUTO: 88.7 FL (ref 80–94)
PLATELET # BLD AUTO: 228 10^3/UL (ref 130–400)
POTASSIUM SERPL-SCNC: 4.2 MMOL/L (ref 3.5–5.1)
PROT SERPL-MCNC: 7.4 G/DL (ref 6.3–8.2)
SODIUM SERPL-SCNC: 143 MMOL/L (ref 135–145)

## 2024-09-25 ENCOUNTER — HOSPITAL ENCOUNTER (INPATIENT)
Dept: HOSPITAL 99 - 2 SOUTH | Age: 34
LOS: 1 days | Discharge: HOME | DRG: 460 | End: 2024-09-26
Payer: COMMERCIAL

## 2024-09-25 VITALS — SYSTOLIC BLOOD PRESSURE: 139 MMHG | RESPIRATION RATE: 16 BRPM | DIASTOLIC BLOOD PRESSURE: 84 MMHG

## 2024-09-25 VITALS — RESPIRATION RATE: 18 BRPM

## 2024-09-25 VITALS
RESPIRATION RATE: 13 BRPM | OXYGEN SATURATION: 2 % | SYSTOLIC BLOOD PRESSURE: 139 MMHG | DIASTOLIC BLOOD PRESSURE: 86 MMHG

## 2024-09-25 VITALS — OXYGEN SATURATION: 2 % | RESPIRATION RATE: 18 BRPM

## 2024-09-25 VITALS — OXYGEN SATURATION: 900 %

## 2024-09-25 VITALS — DIASTOLIC BLOOD PRESSURE: 77 MMHG | RESPIRATION RATE: 20 BRPM | SYSTOLIC BLOOD PRESSURE: 155 MMHG

## 2024-09-25 VITALS — SYSTOLIC BLOOD PRESSURE: 146 MMHG | RESPIRATION RATE: 18 BRPM | DIASTOLIC BLOOD PRESSURE: 77 MMHG

## 2024-09-25 VITALS — BODY MASS INDEX: 25.7 KG/M2

## 2024-09-25 VITALS — OXYGEN SATURATION: 2 %

## 2024-09-25 DIAGNOSIS — M51.27: Primary | ICD-10-CM

## 2024-09-25 DIAGNOSIS — Z87.891: ICD-10-CM

## 2024-09-25 DIAGNOSIS — Z79.899: ICD-10-CM

## 2024-09-25 PROCEDURE — 0SG30AJ FUSION OF LUMBOSACRAL JOINT WITH INTERBODY FUSION DEVICE, POSTERIOR APPROACH, ANTERIOR COLUMN, OPEN APPROACH: ICD-10-PCS | Performed by: ORTHOPAEDIC SURGERY

## 2024-09-25 PROCEDURE — 0SB40ZZ EXCISION OF LUMBOSACRAL DISC, OPEN APPROACH: ICD-10-PCS | Performed by: ORTHOPAEDIC SURGERY

## 2024-09-25 RX ADMIN — TRAMADOL HYDROCHLORIDE 50 MG: 50 TABLET, COATED ORAL at 21:06

## 2024-09-25 RX ADMIN — ACETAMINOPHEN 1000 MG: 500 TABLET ORAL at 10:23

## 2024-09-25 RX ADMIN — TRAMADOL HYDROCHLORIDE 50 MG: 50 TABLET, COATED ORAL at 17:37

## 2024-09-25 RX ADMIN — KETOROLAC TROMETHAMINE 15 MG: 15 INJECTION, SOLUTION INTRAMUSCULAR; INTRAVENOUS at 15:16

## 2024-09-25 RX ADMIN — KETOROLAC TROMETHAMINE 15 MG: 15 INJECTION, SOLUTION INTRAMUSCULAR; INTRAVENOUS at 22:14

## 2024-09-25 RX ADMIN — SODIUM CHLORIDE, SODIUM ACETATE ANHYDROUS, SODIUM GLUCONATE, POTASSIUM CHLORIDE, AND MAGNESIUM CHLORIDE 1000: 526; 222; 502; 37; 30 INJECTION, SOLUTION INTRAVENOUS at 17:59

## 2024-09-25 RX ADMIN — CEFAZOLIN 5: 10 INJECTION, POWDER, FOR SOLUTION INTRAVENOUS at 17:53

## 2024-09-25 RX ADMIN — METAXALONE 800 MG: 800 TABLET ORAL at 10:23

## 2024-09-25 RX ADMIN — ONDANSETRON HYDROCHLORIDE 4 MG: 2 SOLUTION INTRAMUSCULAR; INTRAVENOUS at 14:39

## 2024-09-25 RX ADMIN — HYDROMORPHONE HYDROCHLORIDE 0.5 MG: 1 INJECTION, SOLUTION INTRAMUSCULAR; INTRAVENOUS; SUBCUTANEOUS at 14:48

## 2024-09-25 RX ADMIN — SENNOSIDES 17.2 MG: 8.6 TABLET ORAL at 21:06

## 2024-09-25 RX ADMIN — DOCUSATE SODIUM 100 MG: 100 CAPSULE, LIQUID FILLED ORAL at 21:06

## 2024-09-25 RX ADMIN — PREGABALIN 150 MG: 75 CAPSULE ORAL at 10:23

## 2024-09-25 RX ADMIN — PROCHLORPERAZINE EDISYLATE 5 MG: 5 INJECTION INTRAMUSCULAR; INTRAVENOUS at 15:12

## 2024-09-25 RX ADMIN — GABAPENTIN 300 MG: 300 CAPSULE ORAL at 17:41

## 2024-09-25 RX ADMIN — HYDROMORPHONE HYDROCHLORIDE 0.5 MG: 1 INJECTION, SOLUTION INTRAMUSCULAR; INTRAVENOUS; SUBCUTANEOUS at 14:29

## 2024-09-25 RX ADMIN — CELECOXIB 200 MG: 200 CAPSULE ORAL at 10:23

## 2024-09-25 RX ADMIN — GABAPENTIN 300 MG: 300 CAPSULE ORAL at 21:05

## 2024-09-25 RX ADMIN — ACETAMINOPHEN 1000 MG: 500 TABLET ORAL at 21:05

## 2024-09-25 RX ADMIN — ACETAMINOPHEN: 500 TABLET ORAL at 21:07

## 2024-09-25 RX ADMIN — SODIUM CHLORIDE, SODIUM ACETATE ANHYDROUS, SODIUM GLUCONATE, POTASSIUM CHLORIDE, AND MAGNESIUM CHLORIDE 1000: 526; 222; 502; 37; 30 INJECTION, SOLUTION INTRAVENOUS at 10:23

## 2024-09-25 NOTE — W.PN.UPDATE
"Update Note"~"Progress Note Update"~"-: "~"Lumbosacral intervertebral disc displacement s/p Left L5-S1 transforaminal interbody fusion w/ Dr Gipson 9/25/24"~"- s/p Left L5-S1 discectomy, 11/8/23, w/ Dr Gipson"~"DVT prophylaxis - b/l SCDs/TEDs"

## 2024-09-25 NOTE — PTCARENOTE
Pt arrive to [facility] from PACU at 1910 with IVF infusing. Surgical site assessed and dressing is C/D/I. Pt has SCDs on and c/o 4/10 pain. Head to toe complete. Will continue to monitor.
no

## 2024-09-26 VITALS — SYSTOLIC BLOOD PRESSURE: 132 MMHG | DIASTOLIC BLOOD PRESSURE: 59 MMHG

## 2024-09-26 VITALS — HEART RATE: 61 BPM | SYSTOLIC BLOOD PRESSURE: 142 MMHG | OXYGEN SATURATION: 99 % | DIASTOLIC BLOOD PRESSURE: 83 MMHG

## 2024-09-26 VITALS — DIASTOLIC BLOOD PRESSURE: 91 MMHG | SYSTOLIC BLOOD PRESSURE: 172 MMHG | RESPIRATION RATE: 18 BRPM

## 2024-09-26 VITALS — SYSTOLIC BLOOD PRESSURE: 134 MMHG | DIASTOLIC BLOOD PRESSURE: 52 MMHG | RESPIRATION RATE: 16 BRPM

## 2024-09-26 VITALS — DIASTOLIC BLOOD PRESSURE: 86 MMHG | SYSTOLIC BLOOD PRESSURE: 137 MMHG | RESPIRATION RATE: 18 BRPM

## 2024-09-26 LAB
BUN SERPL-MCNC: 13 MG/DL (ref 9–20)
CALCIUM SERPL-MCNC: 9.1 MG/DL (ref 8.4–10.2)
CHLORIDE SERPL-SCNC: 105 MMOL/L (ref 98–107)
CO2 SERPL-SCNC: 27 MMOL/L (ref 22–30)
EGFR: > 60
ESTIMATED CREATININE CLEARANCE: > 125 ML/MIN
GLUCOSE SERPL-MCNC: 101 MG/DL (ref 70–99)
HCT VFR BLD AUTO: 40.8 % (ref 39–52)
HGB BLD-MCNC: 14.8 G/DL (ref 13–18)
POTASSIUM SERPL-SCNC: 4.7 MMOL/L (ref 3.5–5.1)
SODIUM SERPL-SCNC: 142 MMOL/L (ref 135–145)

## 2024-09-26 RX ADMIN — DOCUSATE SODIUM 100 MG: 100 CAPSULE, LIQUID FILLED ORAL at 08:46

## 2024-09-26 RX ADMIN — SODIUM CHLORIDE, SODIUM ACETATE ANHYDROUS, SODIUM GLUCONATE, POTASSIUM CHLORIDE, AND MAGNESIUM CHLORIDE 1000: 526; 222; 502; 37; 30 INJECTION, SOLUTION INTRAVENOUS at 02:59

## 2024-09-26 RX ADMIN — ACETAMINOPHEN 1000 MG: 500 TABLET ORAL at 09:08

## 2024-09-26 RX ADMIN — ACETAMINOPHEN 1000 MG: 500 TABLET ORAL at 03:00

## 2024-09-26 RX ADMIN — SENNOSIDES 17.2 MG: 8.6 TABLET ORAL at 08:46

## 2024-09-26 RX ADMIN — TRAMADOL HYDROCHLORIDE 50 MG: 50 TABLET, COATED ORAL at 09:08

## 2024-09-26 RX ADMIN — TRAMADOL HYDROCHLORIDE 50 MG: 50 TABLET, COATED ORAL at 03:00

## 2024-09-26 RX ADMIN — GABAPENTIN 300 MG: 300 CAPSULE ORAL at 08:46

## 2024-09-26 RX ADMIN — CEFAZOLIN 5: 10 INJECTION, POWDER, FOR SOLUTION INTRAVENOUS at 02:59

## 2024-09-26 NOTE — W.DS.TRANS
"DC Summary - Transcription"~"-"~"Discharge Instructions: "~"Sleep Apnea Risk              	Low                                             	"~"Discharge Diagnosis/Procedures	Lumbosacral intervertebral disc displacement s/p	"~"                              	Left L5-S1 transforaminal interbody fusion w/   	"~"                              	Dr Gipson 9/25/24                                  	"~"Diet                          	Regular                                         	"~"Activity                      	As tolerated                                    	"~"Additional Activity           	No heavy lifting &gt;10 lbs                        	"~"Driving Restrictions          	Not until seen by your Dr                       	"~"Bathing Restrictions          	OK to shower in 4 days                          	"~"Instructions:       "~"Stand-Alone Forms:  [facility]~"Changes to Home Medications: Yes"~"Discharge Medications: "~"DC Medications w/original date entered in [employer]"~"Saccharomyces boulardii 250 mg capsule (Florastor) 250 mg PO BID #10 caps 09/26/24 "~"acetaminophen 500 mg tablet (Tylenol Extra Strength) 1,000 mg (2 x 500 mg) PO Q6H #60 tabs 09/26/24 "~"cephalexin 500 mg capsule 500 mg PO QID #20 caps 09/26/24 "~"docusate sodium 100 mg capsule 100 mg PO BID #30 caps 09/26/24 "~"gabapentin 300 mg capsule 300 mg PO TID neuropathic pain #1 cap 09/26/24 "~"ondansetron HCl 4 mg tablet 4 mg PO Q6H PRN nausea and vomiting #30 tabs 09/26/24 "~"sennosides 8.6 mg tablet (Senna Laxative) 17.2 mg (2 x 8.6 mg) PO BID #30 tabs 09/26/24 "~"tramadol 50 mg tablet 50 - 100 mg (1 - 2 x 50 mg) PO Q6H PRN moderate-severe pain #30 tabs 09/26/24 "~"Home Medication Changes  "~"Saccharomyces boulardii 250 mg capsule (Florastor) 250 mg PO BID #10 caps 09/26/24 "~"acetaminophen 500 mg tablet (Tylenol Extra Strength) 1,000 mg (2 x 500 mg) PO Q6H #60 tabs 09/26/24 "~"cephalexin 500 mg capsule 500 mg PO QID #20 caps 09/26/24 "~"docusate sodium 100 mg capsule 100 mg PO BID #30 caps 09/26/24 "~"ondansetron HCl 4 mg tablet 4 mg PO Q6H PRN nausea and vomiting #30 tabs 09/26/24 "~"sennosides 8.6 mg tablet (Senna Laxative) 17.2 mg (2 x 8.6 mg) PO BID #30 tabs 09/26/24 "~"tramadol 50 mg tablet 50 - 100 mg (1 - 2 x 50 mg) PO Q6H PRN moderate-severe pain #30 tabs 09/26/24 "~"Pending Results: No"

## 2024-09-26 NOTE — CM
"Met with patient and wife."~"IA Completed.  No neeDS"~"PCP:  Moni Martinez"~"Pharmacy: [facility], [place]"~"PLAN:  Discharge to home, no needs."~"          Wife to transport"

## 2024-11-27 ENCOUNTER — EVALUATION (OUTPATIENT)
Dept: PHYSICAL THERAPY | Facility: CLINIC | Age: 34
End: 2024-11-27
Payer: OTHER MISCELLANEOUS

## 2024-11-27 DIAGNOSIS — G89.29 CHRONIC MIDLINE LOW BACK PAIN WITHOUT SCIATICA: Primary | ICD-10-CM

## 2024-11-27 DIAGNOSIS — M54.50 CHRONIC MIDLINE LOW BACK PAIN WITHOUT SCIATICA: Primary | ICD-10-CM

## 2024-11-27 DIAGNOSIS — Z98.1 S/P LUMBAR FUSION: ICD-10-CM

## 2024-11-27 PROCEDURE — 97162 PT EVAL MOD COMPLEX 30 MIN: CPT | Performed by: PHYSICAL THERAPIST

## 2024-11-27 PROCEDURE — 97110 THERAPEUTIC EXERCISES: CPT | Performed by: PHYSICAL THERAPIST

## 2024-11-27 PROCEDURE — 97112 NEUROMUSCULAR REEDUCATION: CPT | Performed by: PHYSICAL THERAPIST

## 2024-11-27 NOTE — LETTER
2024    Carmelo Ya PA-C  [address] [address]    Patient: Gavin Morgan   YOB: 1990   Date of Visit: 2024     Encounter Diagnosis     ICD-10-CM    1. Chronic midline low back pain without sciatica  M54.50     G89.29       2. S/P lumbar fusion  Z98.1           Dear Dr. Ya:    Thank you for your recent referral of Gavin Morgan. Please review the attached evaluation summary from Gavin's recent visit.     Please verify that you agree with the plan of care by signing the attached order.     If you have any questions or concerns, please do not hesitate to call.     I sincerely appreciate the opportunity to share in the care of one of your patients and hope to have another opportunity to work with you in the near future.       Sincerely,    Christie Garrett, PT      Referring Provider:      I certify that I have read the below Plan of Care and certify the need for these services furnished under this plan of treatment while under my care.                    Carmelo Ya PA-C  [address] [address]  Via Fax: [phone]          PT Evaluation     Today's date: 2024  Patient name: Gavin Morgna  : 1990  MRN: [id number]  Referring provider: Carmelo Ya PA-C  Dx:   Encounter Diagnosis     ICD-10-CM    1. Chronic midline low back pain without sciatica  M54.50     G89.29       2. S/P lumbar fusion  Z98.1                      Assessment  Impairments: abnormal or restricted ROM, activity intolerance, impaired physical strength, lacks appropriate home exercise program and pain with function    Assessment details: Patient is a 35 y/o male s/p lumbar fusion on 24.  He also has a history of disectomy in 2023.  Patient presents with decreased functional mobility due to  mildy increased pain, decreased hip and core strength, decreased lumbar  ROM, decreased  endurance associated with lumbar fusion surgery.  Patient will benefit from skilled physical therapy to address impairment and improve functional mobility.  PT needed to allow for return to maximal function and improve quality of life.   Understanding of Dx/Px/POC: good     Prognosis: good    Goals  STG within 4 weeks:   1. Patient to be independent in HEP.   2. Reduce pain by 50% to improve quality of life.   3. Improve hip strength to 5/5 in all planes.  LTG within 8 weeks:   1. Patient to be independent in ADLs/IADLs without difficulty.  2. Patient to exhibit correct squat technique.    3. Patient to be independent in comprehensive HEP.     Plan  Patient would benefit from: skilled physical therapy and PT eval  Planned modality interventions: cryotherapy, hydrotherapy and unattended electrical stimulation    Planned therapy interventions: therapeutic training, therapeutic exercise, therapeutic activities, stretching, strengthening, postural training, patient education, neuromuscular re-education, manual therapy, joint mobilization, IADL retraining, activity modification, ADL retraining, ADL training, body mechanics training, flexibility, functional ROM exercises, gait training, graded activity, graded exercise, graded motor, home exercise program and abdominal trunk stabilization    Frequency: 2x week  Duration in weeks: 8  Plan of Care beginning date: 11/27/2024  Plan of Care expiration date: 1/22/2025  Treatment plan discussed with: patient    Subjective Evaluation    History of Present Illness  Mechanism of injury: Patient is a 35 y/o male with chief complaints of chronic low back pain.   He had lumbar fusion on 9/25/24.  He had an L5-S1 discectomy in November 2023. He was seen in PT, but due to persistent pain, his PT stopped and he returned back to surgeon.  Since surgery, he states his radiating pain has decreased.  He states he hasn't had much pain.  He was seen by physician, his restrictions were lifted  "(he reports), and he is referred for PT evaluation and treatment.    Patient Goals  Patient goal: \"To be done with all this.\"  Pain  At best pain ratin  At worst pain ratin  Quality: dull ache  Alleviating factors: standing up straight.  Exacerbated by: bending, squatting.    Social Support    Employment status: not working  Exercise history: walking the dog every day    Treatments  Previous treatment: physical therapy      Objective     Concurrent Complaints  Negative for bladder dysfunction, bowel dysfunction and saddle (S4) numbness    Active Range of Motion     Lumbar   Extension:  Restriction level: minimal  Left lateral flexion:  Restriction level: minimal  Right lateral flexion:  Restriction level: minimal    Strength/Myotome Testing     Left Hip   Planes of Motion   Flexion: 4+  Extension: 4+  Abduction: 4+  Adduction: 4+    Right Hip   Planes of Motion   Flexion: 4+  Extension: 4+  Abduction: 4+  Adduction: 4+    Left Knee   Flexion: 4+  Extension: 4+    Right Knee   Flexion: 4+  Extension: 4+    Left Ankle/Foot   Dorsiflexion: 4+  Great toe extension: 4+    Right Ankle/Foot   Dorsiflexion: 4+  Great toe extension: 4+             Precautions: h/o previous discectomy; s/p lumbar fusion 24     Increased time spent on patient education with diagnosis, prognosis, goals of therapy, progression of therapy, and plan of care.  All questions answered. Patient instructed to call clinic with questions or concerns.       [email or web address]  Access Code: [email or web address]      Manuals                                                                  Neuro Re-Ed             TA 3\"x20            DLS w bolster push in HL  3\"x20             Prone hip ext 2x10 ea            Clamshell  10\"x10 ea                                                    Ther Ex             Pt Edu  KS                                                                                                       Ther Activity               "                         Gait Training                                       Modalities

## 2024-11-27 NOTE — PROGRESS NOTES
PT Evaluation     Today's date: 2024  Patient name: Gavin Morgan  : 1990  MRN: [id number]  Referring provider: Carmelo Ya PA-C  Dx:   Encounter Diagnosis     ICD-10-CM    1. Chronic midline low back pain without sciatica  M54.50     G89.29       2. S/P lumbar fusion  Z98.1                      Assessment  Impairments: abnormal or restricted ROM, activity intolerance, impaired physical strength, lacks appropriate home exercise program and pain with function    Assessment details: Patient is a 33 y/o male s/p lumbar fusion on 24.  He also has a history of disectomy in 2023.  Patient presents with decreased functional mobility due to  mildy increased pain, decreased hip and core strength, decreased lumbar  ROM, decreased endurance associated with lumbar fusion surgery.  Patient will benefit from skilled physical therapy to address impairment and improve functional mobility.  PT needed to allow for return to maximal function and improve quality of life.   Understanding of Dx/Px/POC: good     Prognosis: good    Goals  STG within 4 weeks:   1. Patient to be independent in HEP.   2. Reduce pain by 50% to improve quality of life.   3. Improve hip strength to 5/5 in all planes.  LTG within 8 weeks:   1. Patient to be independent in ADLs/IADLs without difficulty.  2. Patient to exhibit correct squat technique.    3. Patient to be independent in comprehensive HEP.     Plan  Patient would benefit from: skilled physical therapy and PT eval  Planned modality interventions: cryotherapy, hydrotherapy and unattended electrical stimulation    Planned therapy interventions: therapeutic training, therapeutic exercise, therapeutic activities, stretching, strengthening, postural training, patient education, neuromuscular re-education, manual therapy, joint mobilization, IADL retraining, activity modification, ADL retraining, ADL training, body mechanics training, flexibility, functional ROM exercises, gait  "training, graded activity, graded exercise, graded motor, home exercise program and abdominal trunk stabilization    Frequency: 2x week  Duration in weeks: 8  Plan of Care beginning date: 2024  Plan of Care expiration date: 2025  Treatment plan discussed with: patient    Subjective Evaluation    History of Present Illness  Mechanism of injury: Patient is a 33 y/o male with chief complaints of chronic low back pain.   He had lumbar fusion on 24.  He had an L5-S1 discectomy in 2023. He was seen in PT, but due to persistent pain, his PT stopped and he returned back to surgeon.  Since surgery, he states his radiating pain has decreased.  He states he hasn't had much pain.  He was seen by physician, his restrictions were lifted (he reports), and he is referred for PT evaluation and treatment.    Patient Goals  Patient goal: \"To be done with all this.\"  Pain  At best pain ratin  At worst pain ratin  Quality: dull ache  Alleviating factors: standing up straight.  Exacerbated by: bending, squatting.    Social Support    Employment status: not working  Exercise history: walking the dog every day    Treatments  Previous treatment: physical therapy      Objective     Concurrent Complaints  Negative for bladder dysfunction, bowel dysfunction and saddle (S4) numbness    Active Range of Motion     Lumbar   Extension:  Restriction level: minimal  Left lateral flexion:  Restriction level: minimal  Right lateral flexion:  Restriction level: minimal    Strength/Myotome Testing     Left Hip   Planes of Motion   Flexion: 4+  Extension: 4+  Abduction: 4+  Adduction: 4+    Right Hip   Planes of Motion   Flexion: 4+  Extension: 4+  Abduction: 4+  Adduction: 4+    Left Knee   Flexion: 4+  Extension: 4+    Right Knee   Flexion: 4+  Extension: 4+    Left Ankle/Foot   Dorsiflexion: 4+  Great toe extension: 4+    Right Ankle/Foot   Dorsiflexion: 4+  Great toe extension: 4+             Precautions: h/o previous " "discectomy; s/p lumbar fusion 9/25/24     Increased time spent on patient education with diagnosis, prognosis, goals of therapy, progression of therapy, and plan of care.  All questions answered. Patient instructed to call clinic with questions or concerns.       [email or web address]  Access Code: [email or web address]      Manuals 11/27                                                                 Neuro Re-Ed             TA 3\"x20            DLS w bolster push in HL  3\"x20             Prone hip ext 2x10 ea            Clamshell  10\"x10 ea                                                    Ther Ex             Pt Edu  KS                                                                                                       Ther Activity                                       Gait Training                                       Modalities                                            "

## 2024-12-04 ENCOUNTER — OFFICE VISIT (OUTPATIENT)
Dept: PHYSICAL THERAPY | Facility: CLINIC | Age: 34
End: 2024-12-04
Payer: OTHER MISCELLANEOUS

## 2024-12-04 DIAGNOSIS — M54.50 CHRONIC MIDLINE LOW BACK PAIN WITHOUT SCIATICA: ICD-10-CM

## 2024-12-04 DIAGNOSIS — G89.29 CHRONIC MIDLINE LOW BACK PAIN WITHOUT SCIATICA: ICD-10-CM

## 2024-12-04 DIAGNOSIS — Z98.1 S/P LUMBAR FUSION: Primary | ICD-10-CM

## 2024-12-04 PROCEDURE — 97112 NEUROMUSCULAR REEDUCATION: CPT

## 2024-12-04 PROCEDURE — 97110 THERAPEUTIC EXERCISES: CPT

## 2024-12-04 NOTE — PROGRESS NOTES
"Daily Note     Today's date: 2024  Patient name: Gavin Morgan  : 1990  MRN: [id number]  Referring provider: Carmelo Ya PA-C  Dx:   Encounter Diagnosis     ICD-10-CM    1. S/P lumbar fusion  Z98.1       2. Chronic midline low back pain without sciatica  M54.50     G89.29                      Subjective: Pt reports his back is really only sore when he bends over, in the morning upon waking, and when he sneezes and coughs.  Otherwise, no new complaints.      Objective: See treatment diary below      Assessment: Pt benefits from TM walking in order to improve muscular endurance.  Presents with lack of lumbopelvic motor control.  Tolerates core and hip stabilization NRE well without exacerbation of sx.  Pt would benefit from continued PT.      Plan: Continue per plan of care.      Precautions: h/o previous discectomy; s/p lumbar fusion 24       [email or web address]  Access Code: [email or web address]      Manuals                                                                Neuro Re-Ed             TA 3\"x20 3\"x20           DLS w bolster push in HL  3\"x20  Deadbug c PB 2x10           Prone hip ext 2x10 ea 2x10 ea           Clamshell  10\"x10 ea             TA SLR  2x10           Bird dog  2x10                        Ther Ex             Pt Edu  KS AF           TM  8'             bridges  2x12           S/l hip abd  2x12                                                               Ther Activity                                       Gait Training                                       Modalities                                            "

## 2024-12-06 ENCOUNTER — OFFICE VISIT (OUTPATIENT)
Dept: PHYSICAL THERAPY | Facility: CLINIC | Age: 34
End: 2024-12-06
Payer: OTHER MISCELLANEOUS

## 2024-12-06 DIAGNOSIS — M54.50 CHRONIC MIDLINE LOW BACK PAIN WITHOUT SCIATICA: ICD-10-CM

## 2024-12-06 DIAGNOSIS — G89.29 CHRONIC MIDLINE LOW BACK PAIN WITHOUT SCIATICA: ICD-10-CM

## 2024-12-06 DIAGNOSIS — Z98.1 S/P LUMBAR FUSION: Primary | ICD-10-CM

## 2024-12-06 PROCEDURE — 97110 THERAPEUTIC EXERCISES: CPT | Performed by: PHYSICAL THERAPIST

## 2024-12-06 PROCEDURE — 97112 NEUROMUSCULAR REEDUCATION: CPT | Performed by: PHYSICAL THERAPIST

## 2024-12-06 NOTE — PROGRESS NOTES
"Daily Note     Today's date: 2024  Patient name: Gavin Morgan  : 1990  MRN: [id number]  Referring provider: Carmelo Ya PA-C  Dx:   Encounter Diagnosis     ICD-10-CM    1. S/P lumbar fusion  Z98.1       2. Chronic midline low back pain without sciatica  M54.50     G89.29                      Subjective: Patient states he's doing well.  He states he noticed a popping in his left hip with a few exercises last visit.        Objective: See treatment diary below      Assessment: Added exercises today to his exercise program to include Paloff press, standing hip abd, and supine TA with marching.  Reduced \"snapping hip\" following kneeling hip flexor stretch.  Updated written HEP provided to patient.  No complaints post session.        Plan: Continue per plan of care.      Precautions: h/o previous discectomy; s/p lumbar fusion 24       [email or web address]  Access Code: [email or web address]      Manuals                                                                Neuro Re-Ed             TA 3\"x20 3\"x20 5\" x30           DLS w bolster push in HL  3\"x20  Deadbug c PB 2x10 Deadbug w PB 2x10           Prone hip ext 2x10 ea 2x10 ea           Clamshell  10\"x10 ea             TA SLR  2x10 2x10           Bird dog  2x10           Paloff press   Red x 20 ea           Supine marching w TA    3\" x20                        Ther Ex             Pt Edu  KS AF KS          TM  8'             Recumbent bike w lumbar roll    8'           bridges  2x12 2x10           S/l hip abd  2x12 2x10 ea           Standing hip abd    Red 2x10 ea          Kneeling hip flexor stretch    4x15\" ea                                     Ther Activity                                       Gait Training                                       Modalities                                            "

## 2024-12-09 ENCOUNTER — OFFICE VISIT (OUTPATIENT)
Dept: PHYSICAL THERAPY | Facility: CLINIC | Age: 34
End: 2024-12-09
Payer: OTHER MISCELLANEOUS

## 2024-12-09 DIAGNOSIS — M54.50 CHRONIC MIDLINE LOW BACK PAIN WITHOUT SCIATICA: ICD-10-CM

## 2024-12-09 DIAGNOSIS — Z98.1 S/P LUMBAR FUSION: Primary | ICD-10-CM

## 2024-12-09 DIAGNOSIS — G89.29 CHRONIC MIDLINE LOW BACK PAIN WITHOUT SCIATICA: ICD-10-CM

## 2024-12-09 PROCEDURE — 97110 THERAPEUTIC EXERCISES: CPT

## 2024-12-09 PROCEDURE — 97112 NEUROMUSCULAR REEDUCATION: CPT

## 2024-12-09 NOTE — PROGRESS NOTES
"Daily Note     Today's date: 2024  Patient name: Gavin Morgan  : 1990  MRN: [id number]  Referring provider: Carmelo aY PA-C  Dx:   Encounter Diagnosis     ICD-10-CM    1. S/P lumbar fusion  Z98.1       2. Chronic midline low back pain without sciatica  M54.50     G89.29                      Subjective: Pt denies complications following last visit.  Offers no new complaints upon arrival.      Objective: See treatment diary below      Assessment: Continued lumbopelvic motor control deficits.  Completes charted interventions without c/o pain or discomfort.  Consider additional functional training nv as able.  Pt would benefit from continued PT.      Plan: Continue per plan of care.      Precautions: h/o previous discectomy; s/p lumbar fusion 24       [email or web address]  Access Code: [email or web address]      Manuals                                                              Neuro Re-Ed             TA 3\"x20 3\"x20 5\" x30           DLS w bolster push in HL  3\"x20  Deadbug c PB 2x10 Deadbug w PB 2x10  Deadbug c PB 2x10         Prone hip ext 2x10 ea 2x10 ea  2x12         Clamshell  10\"x10 ea             TA SLR  2x10 2x10  3x10         Bird dog  2x10           Paloff press   Red x 20 ea  GTB 2x20 BTB        Supine marching w TA    3\" x20  3\"x30                      Ther Ex             Pt Edu  KS AF KS AF         TM  8'  8'           Recumbent bike w lumbar roll    8'           bridges  2x12 2x10  PB 3x10         S/l hip abd  2x12 2x10 ea  3x10 ea         Standing hip abd    Red 2x10 ea RTB 3x10 & ext         Kneeling hip flexor stretch    4x15\" ea  30\"x4         PB squat    2x12                      Ther Activity                                       Gait Training                                       Modalities                                              "

## 2024-12-12 ENCOUNTER — OFFICE VISIT (OUTPATIENT)
Dept: PHYSICAL THERAPY | Facility: CLINIC | Age: 34
End: 2024-12-12
Payer: OTHER MISCELLANEOUS

## 2024-12-12 DIAGNOSIS — Z98.1 S/P LUMBAR FUSION: Primary | ICD-10-CM

## 2024-12-12 DIAGNOSIS — M54.50 CHRONIC MIDLINE LOW BACK PAIN WITHOUT SCIATICA: ICD-10-CM

## 2024-12-12 DIAGNOSIS — G89.29 CHRONIC MIDLINE LOW BACK PAIN WITHOUT SCIATICA: ICD-10-CM

## 2024-12-12 PROCEDURE — 97110 THERAPEUTIC EXERCISES: CPT | Performed by: PHYSICAL THERAPIST

## 2024-12-12 PROCEDURE — 97112 NEUROMUSCULAR REEDUCATION: CPT | Performed by: PHYSICAL THERAPIST

## 2024-12-12 PROCEDURE — 97116 GAIT TRAINING THERAPY: CPT | Performed by: PHYSICAL THERAPIST

## 2024-12-12 NOTE — PROGRESS NOTES
"Daily Note     Today's date: 2024  Patient name: Gavin Morgan  : 1990  MRN: [id number]  Referring provider: Carmelo Ya PA-C  Dx:   Encounter Diagnosis     ICD-10-CM    1. S/P lumbar fusion  Z98.1       2. Chronic midline low back pain without sciatica  M54.50     G89.29                        Subjective: Patient reports feeling well.        Objective: See treatment diary below      Assessment: Patient does well with session. No complaints of pain during or post session.  No snapping hip noted with any exercise this visit; he will continue with kneeling hip flexor stretch.  He will continue to benefit from skilled PT with gradual progression, as tolerated.        Plan: Continue per plan of care.      Precautions: h/o previous discectomy; s/p lumbar fusion 24       [email or web address]  Access Code: [email or web address]      Manuals   12                                                            Neuro Re-Ed             TA 3\"x20 3\"x20 5\" x30   W bolster 3\"X20         DLS w bolster push in HL  3\"x20  Deadbug c PB 2x10 Deadbug w PB 2x10  Deadbug c PB 2x10 Deadbug w PB 2x10         Prone hip ext 2x10 ea 2x10 ea  2x12         Clamshell  10\"x10 ea             TA SLR  2x10 2x10  3x10         Bird dog  2x10           Paloff press   Red x 20 ea  GTB 2x20 BTB 2x15 ea         Supine marching w TA    3\" x20  3\"x30                      Ther Ex             Pt Edu  KS AF KS AF KS        TM  8'  8' 10'           Recumbent bike w lumbar roll    8'           bridges  2x12 2x10  PB 3x10         S/l hip abd  2x12 2x10 ea  3x10 ea         Standing hip abd    Red 2x10 ea RTB 3x10 & ext         Kneeling hip flexor stretch    4x15\" ea  30\"x4         PB squat    2x12         Manual BLE stretching- HS, piri, add      15'         Ther Activity                                       Gait Training             Kesier side step     10# x 10 ea                     Modalities                        "

## 2024-12-16 ENCOUNTER — OFFICE VISIT (OUTPATIENT)
Dept: PHYSICAL THERAPY | Facility: CLINIC | Age: 34
End: 2024-12-16
Payer: OTHER MISCELLANEOUS

## 2024-12-16 DIAGNOSIS — M54.50 CHRONIC MIDLINE LOW BACK PAIN WITHOUT SCIATICA: ICD-10-CM

## 2024-12-16 DIAGNOSIS — G89.29 CHRONIC MIDLINE LOW BACK PAIN WITHOUT SCIATICA: ICD-10-CM

## 2024-12-16 DIAGNOSIS — Z98.1 S/P LUMBAR FUSION: Primary | ICD-10-CM

## 2024-12-16 PROCEDURE — 97110 THERAPEUTIC EXERCISES: CPT

## 2024-12-16 PROCEDURE — 97112 NEUROMUSCULAR REEDUCATION: CPT

## 2024-12-16 NOTE — PROGRESS NOTES
"Daily Note     Today's date: 2024  Patient name: Gavin Morgan  : 1990  MRN: [id number]  Referring provider: Carmelo Ya PA-C  Dx:   Encounter Diagnosis     ICD-10-CM    1. S/P lumbar fusion  Z98.1       2. Chronic midline low back pain without sciatica  M54.50     G89.29                      Subjective: Pt notes low back soreness upon arrival.  Denies any known cause.      Objective: See treatment diary below      Assessment: Improving lumbopelvic stabilization and motor control.  Improvement in sx upon initiation of sx this session.  Completes charted interventions with good awareness of proper form.  Pt would benefit from continued PT.      Plan: Continue per plan of care.      Precautions: h/o previous discectomy; s/p lumbar fusion 24       [email or web address]  Access Code: [email or web address]      Manuals                                                            Neuro Re-Ed             TA 3\"x20 3\"x20 5\" x30   W bolster 3\"X20         DLS w bolster push in HL  3\"x20  Deadbug c PB 2x10 Deadbug w PB 2x10  Deadbug c PB 2x10 Deadbug w PB 2x10  Deadbug c PB 2x10       Prone hip ext 2x10 ea 2x10 ea  2x12         Clamshell  10\"x10 ea             TA SLR  2x10 2x10  3x10  3x10       Bird dog  2x10    2x10       Paloff press   Red x 20 ea  GTB 2x20 BTB 2x15 ea  BTB 2x15       Supine marching w TA    3\" x20  3\"x30                      Ther Ex             Pt Edu  KS AF KS AF KS AF       TM  8'  8' 10'  8'       Recumbent bike w lumbar roll    8'           bridges  2x12 2x10  PB 3x10         S/l hip abd  2x12 2x10 ea  3x10 ea         Standing hip abd    Red 2x10 ea RTB 3x10 & ext  GTB 2x10 & ext       Kneeling hip flexor stretch    4x15\" ea  30\"x4         PB squat    2x12  2x15       Manual BLE stretching- HS, piri, add      15'  Self 30\"x4 HS/piri       Ther Activity                                       Gait Training             Kesier side step     10# x 10 ea " 10# x10                    Modalities

## 2024-12-19 ENCOUNTER — OFFICE VISIT (OUTPATIENT)
Dept: PHYSICAL THERAPY | Facility: CLINIC | Age: 34
End: 2024-12-19
Payer: OTHER MISCELLANEOUS

## 2024-12-19 DIAGNOSIS — G89.29 CHRONIC MIDLINE LOW BACK PAIN WITHOUT SCIATICA: ICD-10-CM

## 2024-12-19 DIAGNOSIS — M54.50 CHRONIC MIDLINE LOW BACK PAIN WITHOUT SCIATICA: ICD-10-CM

## 2024-12-19 DIAGNOSIS — Z98.1 S/P LUMBAR FUSION: Primary | ICD-10-CM

## 2024-12-19 PROCEDURE — 97112 NEUROMUSCULAR REEDUCATION: CPT | Performed by: PHYSICAL THERAPIST

## 2024-12-19 PROCEDURE — 97110 THERAPEUTIC EXERCISES: CPT | Performed by: PHYSICAL THERAPIST

## 2024-12-19 NOTE — PROGRESS NOTES
"Daily Note     Today's date: 2024  Patient name: Gavin Morgan  : 1990  MRN: [id number]  Referring provider: Carmelo Ya PA-C  Dx:   Encounter Diagnosis     ICD-10-CM    1. S/P lumbar fusion  Z98.1       2. Chronic midline low back pain without sciatica  M54.50     G89.29                      Subjective: Pt states he has felt a little sciatica for only a few seconds when he was bending in his low ceiling basement.  Otherwise, reports he's doing pretty well.       Objective: See treatment diary below      Assessment: Patient does well with session. He has no complaints of pain during session. He will benefit from higher level core strengthening and progression as tolerated.  Discussed proper posture and not being in forward flexed posture.        Plan: Continue per plan of care.      Precautions: h/o previous discectomy; s/p lumbar fusion 24       [email or web address]  Access Code: [email or web address]      Manuals                                                            Neuro Re-Ed             TA 3\"x20 3\"x20 5\" x30   W bolster 3\"X20   Standing TA w PB 5\"x20       DLS w bolster push in HL  3\"x20  Deadbug c PB 2x10 Deadbug w PB 2x10  Deadbug c PB 2x10 Deadbug w PB 2x10  Deadbug c PB 2x10 Deadbug w PB 2x10       Prone hip ext 2x10 ea 2x10 ea  2x12         Clamshell  10\"x10 ea             TA SLR  2x10 2x10  3x10  3x10 HEP       Bird dog  2x10    2x10 2x10       Paloff press   Red x 20 ea  GTB 2x20 BTB 2x15 ea  BTB 2x15 Blue 2x15 ea       Supine marching w TA    3\" x20  3\"x30         Shld ext w TB & SLB        Blue 2x15 ea       Ther Ex             Pt Edu  KS AF KS AF KS AF KS      TM  8'  8' 10'  8'       Recumbent bike w lumbar roll    8'     10'       bridges  2x12 2x10  PB 3x10         S/l hip abd  2x12 2x10 ea  3x10 ea         Standing hip abd    Red 2x10 ea RTB 3x10 & ext  GTB 2x10 & ext Ext Green 2x10 ea       Kneeling hip flexor stretch    4x15\" " "ea  30\"x4   20\"X5       PB squat    2x12  2x15 2x15      Manual BLE stretching- HS, piri, add      15'  Self 30\"x4 HS/piri KS      Ther Activity                                       Gait Training             Kesier side step     10# x 10 ea 10# x10 Equipment unavailable                   Modalities                                                "

## 2024-12-23 ENCOUNTER — APPOINTMENT (OUTPATIENT)
Dept: PHYSICAL THERAPY | Facility: CLINIC | Age: 34
End: 2024-12-23
Payer: OTHER MISCELLANEOUS

## 2024-12-27 ENCOUNTER — APPOINTMENT (OUTPATIENT)
Dept: PHYSICAL THERAPY | Facility: CLINIC | Age: 34
End: 2024-12-27
Payer: OTHER MISCELLANEOUS

## 2024-12-27 ENCOUNTER — OFFICE VISIT (OUTPATIENT)
Dept: PHYSICAL THERAPY | Facility: CLINIC | Age: 34
End: 2024-12-27
Payer: OTHER MISCELLANEOUS

## 2024-12-27 DIAGNOSIS — Z98.1 S/P LUMBAR FUSION: Primary | ICD-10-CM

## 2024-12-27 DIAGNOSIS — G89.29 CHRONIC MIDLINE LOW BACK PAIN WITHOUT SCIATICA: ICD-10-CM

## 2024-12-27 DIAGNOSIS — M54.50 CHRONIC MIDLINE LOW BACK PAIN WITHOUT SCIATICA: ICD-10-CM

## 2024-12-27 PROCEDURE — 97110 THERAPEUTIC EXERCISES: CPT | Performed by: PHYSICAL THERAPIST

## 2024-12-27 PROCEDURE — 97112 NEUROMUSCULAR REEDUCATION: CPT | Performed by: PHYSICAL THERAPIST

## 2024-12-27 PROCEDURE — 97116 GAIT TRAINING THERAPY: CPT | Performed by: PHYSICAL THERAPIST

## 2024-12-27 NOTE — PROGRESS NOTES
"Daily Note     Today's date: 2024  Patient name: Gavin Morgan  : 1990  MRN: [id number]  Referring provider: Carmelo Ya PA-C  Dx:   Encounter Diagnosis     ICD-10-CM    1. S/P lumbar fusion  Z98.1       2. Chronic midline low back pain without sciatica  M54.50     G89.29                      Subjective: Pt states he is feeling good overall. He states he's a little sore today (bone pain) since he hasn't warmed up today, but otherwise reports doing well.  Denies many occasions of radicular pain.        Objective: See treatment diary below      Assessment: Patient does well with session. He has no complaints of pain during session. Verbal cueing required for slow and controlled movement during exercise.  Appropriate stretch response throughout LE stretching. No complaints post session.        Plan: Continue per plan of care.      Precautions: h/o previous discectomy; s/p lumbar fusion 24       [email or web address]  Access Code: [email or web address]      Manuals                                                           Neuro Re-Ed             TA 3\"x20 3\"x20 5\" x30   W bolster 3\"X20   Standing TA w PB 5\"x20  Standing TA w PB- marching 2x10      DLS w bolster push in HL  3\"x20  Deadbug c PB 2x10 Deadbug w PB 2x10  Deadbug c PB 2x10 Deadbug w PB 2x10  Deadbug c PB 2x10 Deadbug w PB 2x10  Deadbug w PB 2x10      Prone hip ext 2x10 ea 2x10 ea  2x12         Clamshell  10\"x10 ea             TA SLR  2x10 2x10  3x10  3x10 HEP       Bird dog  2x10    2x10 2x10  2x10      Paloff press   Red x 20 ea  GTB 2x20 BTB 2x15 ea  BTB 2x15 Blue 2x15 ea        Supine marching w TA    3\" x20  3\"x30         Shld ext w TB & SLB        Blue 2x15 ea  15# 2x10      Ther Ex             Pt Edu  KS AF KS AF KS AF KS KS     TM  8'  8' 10'  8'  10'     Recumbent bike w lumbar roll    8'     10'       bridges  2x12 2x10  PB 3x10         S/l hip abd  2x12 2x10 ea  3x10 ea       " "  Standing hip abd    Red 2x10 ea RTB 3x10 & ext  GTB 2x10 & ext Ext Green 2x10 ea       Kneeling hip flexor stretch    4x15\" ea  30\"x4   20\"X5       PB squat    2x12  2x15 2x15 2x10      Manual BLE stretching- HS, piri, add      15'  Self 30\"x4 HS/piri KS KS     Ther Activity                                       Gait Training             [place]esier side step     10# x 10 ea 10# x10 Equipment unavailable 13# x 10 ea      [place] retrogait         20# x10      Modalities                                                "

## 2024-12-30 ENCOUNTER — OFFICE VISIT (OUTPATIENT)
Dept: PHYSICAL THERAPY | Facility: CLINIC | Age: 34
End: 2024-12-30
Payer: OTHER MISCELLANEOUS

## 2024-12-30 DIAGNOSIS — M54.50 CHRONIC MIDLINE LOW BACK PAIN WITHOUT SCIATICA: ICD-10-CM

## 2024-12-30 DIAGNOSIS — Z98.1 S/P LUMBAR FUSION: Primary | ICD-10-CM

## 2024-12-30 DIAGNOSIS — G89.29 CHRONIC MIDLINE LOW BACK PAIN WITHOUT SCIATICA: ICD-10-CM

## 2024-12-30 PROCEDURE — 97112 NEUROMUSCULAR REEDUCATION: CPT | Performed by: PHYSICAL THERAPIST

## 2024-12-30 PROCEDURE — 97116 GAIT TRAINING THERAPY: CPT | Performed by: PHYSICAL THERAPIST

## 2024-12-30 PROCEDURE — 97110 THERAPEUTIC EXERCISES: CPT | Performed by: PHYSICAL THERAPIST

## 2024-12-30 NOTE — PROGRESS NOTES
"Daily Note     Today's date: 2024  Patient name: Gavin Morgan  : 1990  MRN: [id number]  Referring provider: Carmelo Ya PA-C  Dx:   Encounter Diagnosis     ICD-10-CM    1. S/P lumbar fusion  Z98.1       2. Chronic midline low back pain without sciatica  M54.50     G89.29                      Subjective: Pt states he is feeling good overall. States the only time he has pain is when bending forward in his low basement ceiling or when he coughs.        Objective: See treatment diary below      Assessment: Patient does well with session. He has no complaints of pain during session. He exhibits difficulty during mini squat without UE support and requires verbal and manual cueing to prevent anterior translation of knees.  He is able to increase resistance this visit with bird dog and Paloff press.  No complaints of pain post session.       Plan: Continue per plan of care.      Precautions: h/o previous discectomy; s/p lumbar fusion 24       [email or web address]  Access Code: [email or web address]      Manuals                                                          Neuro Re-Ed             TA 3\"x20 3\"x20 5\" x30   W bolster 3\"X20   Standing TA w PB 5\"x20  Standing TA w PB- marching 2x10  Standing TA w PB- 5\"x30     DLS w bolster push in HL  3\"x20  Deadbug c PB 2x10 Deadbug w PB 2x10  Deadbug c PB 2x10 Deadbug w PB 2x10  Deadbug c PB 2x10 Deadbug w PB 2x10  Deadbug w PB 2x10  Deadbug w PB 2x10     Prone hip ext 2x10 ea 2x10 ea  2x12         Clamshell  10\"x10 ea             TA SLR  2x10 2x10  3x10  3x10 HEP       Bird dog  2x10    2x10 2x10  2x10  X10; 1# on UE/LE 2x10     Paloff press   Red x 20 ea  GTB 2x20 BTB 2x15 ea  BTB 2x15 Blue 2x15 ea    [place] 15# 2x15 ea     Supine marching w TA    3\" x20  3\"x30         Shld ext w TB & SLB        Blue 2x15 ea  15# 2x10      Ther Ex             Pt Edu  KS AF KS AF KS AF KS KS KS    TM  8'  8' 10'  8'  " "10'     Recumbent bike w lumbar roll for endurance    8'     10'   Lv 5 10'     bridges  2x12 2x10  PB 3x10         S/l hip abd  2x12 2x10 ea  3x10 ea         Standing hip abd    Red 2x10 ea RTB 3x10 & ext  GTB 2x10 & ext Ext Green 2x10 ea       Kneeling hip flexor stretch    4x15\" ea  30\"x4   20\"X5   20\"x5 ea    PB squat    2x12  2x15 2x15 2x10  2x10     Manual BLE stretching- HS, piri, add      15'  Self 30\"x4 HS/piri KS KS decline    Ther Activity                                       Gait Training             Kesier side step     10# x 10 ea 10# x10 Equipment unavailable 13# x 10 ea  15# x10 ea     [place] retrogait         20# x10  20# x10     Modalities                                                "

## 2025-01-02 ENCOUNTER — OFFICE VISIT (OUTPATIENT)
Dept: PHYSICAL THERAPY | Facility: CLINIC | Age: 35
End: 2025-01-02
Payer: OTHER MISCELLANEOUS

## 2025-01-02 DIAGNOSIS — M54.50 CHRONIC MIDLINE LOW BACK PAIN WITHOUT SCIATICA: ICD-10-CM

## 2025-01-02 DIAGNOSIS — Z98.1 S/P LUMBAR FUSION: Primary | ICD-10-CM

## 2025-01-02 DIAGNOSIS — G89.29 CHRONIC MIDLINE LOW BACK PAIN WITHOUT SCIATICA: ICD-10-CM

## 2025-01-02 PROCEDURE — 97112 NEUROMUSCULAR REEDUCATION: CPT

## 2025-01-02 PROCEDURE — 97116 GAIT TRAINING THERAPY: CPT

## 2025-01-02 PROCEDURE — 97110 THERAPEUTIC EXERCISES: CPT

## 2025-01-02 NOTE — PROGRESS NOTES
"Daily Note     Today's date: 2025  Patient name: Gavin Morgan  : 1990  MRN: [id number]  Referring provider: Carmelo Ya PA-C  Dx:   Encounter Diagnosis     ICD-10-CM    1. S/P lumbar fusion  Z98.1       2. Chronic midline low back pain without sciatica  M54.50     G89.29                      Subjective: Pt reports he still only has pain with leaning over the sink.        Objective: See treatment diary below      Assessment: Requires TC to facilitate proper form during plank stabilization.  He does demonstrate R pelvic drop with plank.  Pt does demonstrate lack of lumbopelvic motor control with dynamic core stabilization.  Completes charted interventions without complications.  Pt would benefit from continued PT.      Plan: Continue per plan of care.      Precautions: h/o previous discectomy; s/p lumbar fusion 24       [email or web address]  Access Code: [email or web address]      Manuals                                                        Neuro Re-Ed             TA 3\"x20 3\"x20 5\" x30   W bolster 3\"X20   Standing TA w PB 5\"x20  Standing TA w PB- marching 2x10  Standing TA w PB- 5\"x30     DLS w bolster push in HL  3\"x20  Deadbug c PB 2x10 Deadbug w PB 2x10  Deadbug c PB 2x10 Deadbug w PB 2x10  Deadbug c PB 2x10 Deadbug w PB 2x10  Deadbug w PB 2x10  Deadbug w PB 2x10  Deadbug no PB 2x10 1# UE/LE   Prone hip ext 2x10 ea 2x10 ea  2x12         Clamshell  10\"x10 ea             TA SLR  2x10 2x10  3x10  3x10 HEP       Bird dog  2x10    2x10 2x10  2x10  X10; 1# on UE/LE 2x10  2x10 1# on UE/LE 2x10   Paloff press   Red x 20 ea  GTB 2x20 BTB 2x15 ea  BTB 2x15 Blue 2x15 ea    [place] 15# 2x15 ea  Gomez 15# 2x15   Supine marching w TA    3\" x20  3\"x30         Shld ext w TB & SLB        Blue 2x15 ea  15# 2x10      plank          20\"x3   Bridge c march          3x10   Mod side plank c clamshell          3x10   Ther Ex             Pt Edu  KS AF KS " "AF KS AF KS KS KS AF   TM  8'  8' 10'  8'  10'  10'   Recumbent bike w lumbar roll for endurance    8'     10'   Lv 5 10'     bridges  2x12 2x10  PB 3x10         S/l hip abd  2x12 2x10 ea  3x10 ea         Standing hip abd    Red 2x10 ea RTB 3x10 & ext  GTB 2x10 & ext Ext Green 2x10 ea       Kneeling hip flexor stretch    4x15\" ea  30\"x4   20\"X5   20\"x5 ea 20\"x5 ea   PB squat    2x12  2x15 2x15 2x10  2x10  3x10   Manual BLE stretching- HS, piri, add      15'  Self 30\"x4 HS/piri KS KS decline    Ther Activity                                       Gait Training             Kesier side step     10# x 10 ea 10# x10 Equipment unavailable 13# x 10 ea  15# x10 ea  15# x10 ea   [place] retrogait         20# x10  20# x10  20# x10   Modalities                                                  "

## 2025-01-06 ENCOUNTER — OFFICE VISIT (OUTPATIENT)
Dept: PHYSICAL THERAPY | Facility: CLINIC | Age: 35
End: 2025-01-06
Payer: OTHER MISCELLANEOUS

## 2025-01-06 DIAGNOSIS — Z98.1 S/P LUMBAR FUSION: Primary | ICD-10-CM

## 2025-01-06 DIAGNOSIS — G89.29 CHRONIC MIDLINE LOW BACK PAIN WITHOUT SCIATICA: ICD-10-CM

## 2025-01-06 DIAGNOSIS — M54.50 CHRONIC MIDLINE LOW BACK PAIN WITHOUT SCIATICA: ICD-10-CM

## 2025-01-06 PROCEDURE — 97112 NEUROMUSCULAR REEDUCATION: CPT

## 2025-01-06 PROCEDURE — 97110 THERAPEUTIC EXERCISES: CPT

## 2025-01-06 PROCEDURE — 97116 GAIT TRAINING THERAPY: CPT

## 2025-01-06 NOTE — PROGRESS NOTES
"Daily Note     Today's date: 2025  Patient name: Gavin Morgan  : 1990  MRN: [id number]  Referring provider: Carmelo Ya PA-C  Dx:   Encounter Diagnosis     ICD-10-CM    1. S/P lumbar fusion  Z98.1       2. Chronic midline low back pain without sciatica  M54.50     G89.29                      Subjective: Pt feels his muscles are sore in his lower back when he walk  Pt reports he did have some muscle soreness following last session until the next day.        Objective: See treatment diary below      Assessment: Continued lumbopelvic motor control deficits in unsupported spine positions.  He is able to tolerate progressions without complications.  Does require VC to slow pace of exercise.  Improved lumbar lordosis during planking, but does drop into excessive lordosis with fatigue.  Pt would benefit from continued PT.      Plan: Continue per plan of care.      Precautions: h/o previous discectomy; s/p lumbar fusion 24       [email or web address]  Access Code: [email or web address]      Manuals                                                        Neuro Re-Ed             TA  3\"x20 5\" x30   W bolster 3\"X20   Standing TA w PB 5\"x20  Standing TA w PB- marching 2x10  Standing TA w PB- 5\"x30     DLS w bolster push in HL  Deadbug 3x10 Deadbug c PB 2x10 Deadbug w PB 2x10  Deadbug c PB 2x10 Deadbug w PB 2x10  Deadbug c PB 2x10 Deadbug w PB 2x10  Deadbug w PB 2x10  Deadbug w PB 2x10  Deadbug no PB 2x10 1# UE/LE   Prone hip ext  2x10 ea  2x12         Clamshell              TA SLR  2x10 2x10  3x10  3x10 HEP       Bird dog 2x10 2# on UE/LE 2x10 2x10    2x10 2x10  2x10  X10; 1# on UE/LE 2x10  2x10 1# on UE/LE 2x10   Paloff press [place] 16# 2x15  Red x 20 ea  GTB 2x20 BTB 2x15 ea  BTB 2x15 Blue 2x15 ea    [place] 15# 2x15 ea  [place] 15# 2x15   Supine marching w TA    3\" x20  3\"x30         Shld ext w TB & SLB        Blue 2x15 ea  15# 2x10      plank 20\"x3   " "      20\"x3   Bridge c march 3x10         3x10   Mod side plank c clamshell 3x10         3x10   Ther Ex             Pt Edu  AF AF KS AF KS AF KS KS KS AF   TM 10' 8'  8' 10'  8'  10'  10'   Recumbent bike w lumbar roll for endurance    8'     10'   Lv 5 10'     bridges  2x12 2x10  PB 3x10         S/l hip abd  2x12 2x10 ea  3x10 ea         Standing hip abd    Red 2x10 ea RTB 3x10 & ext  GTB 2x10 & ext Ext Green 2x10 ea       Kneeling hip flexor stretch    4x15\" ea  30\"x4   20\"X5   20\"x5 ea 20\"x5 ea   PB squat YMB 3x10   2x12  2x15 2x15 2x10  2x10  3x10   Manual BLE stretching- HS, piri, add      15'  Self 30\"x4 HS/piri KS KS decline    Ther Activity                                       Gait Training             [place]esier side step 15# x10 ea    10# x 10 ea 10# x10 Equipment unavailable 13# x 10 ea  15# x10 ea  15# x10 ea   [place] retrogait  23# x10       20# x10  20# x10  20# x10   Modalities                                                    "

## 2025-01-08 ENCOUNTER — OFFICE VISIT (OUTPATIENT)
Dept: PHYSICAL THERAPY | Facility: CLINIC | Age: 35
End: 2025-01-08
Payer: OTHER MISCELLANEOUS

## 2025-01-08 DIAGNOSIS — G89.29 CHRONIC MIDLINE LOW BACK PAIN WITHOUT SCIATICA: ICD-10-CM

## 2025-01-08 DIAGNOSIS — Z98.1 S/P LUMBAR FUSION: Primary | ICD-10-CM

## 2025-01-08 DIAGNOSIS — M54.50 CHRONIC MIDLINE LOW BACK PAIN WITHOUT SCIATICA: ICD-10-CM

## 2025-01-08 PROCEDURE — 97116 GAIT TRAINING THERAPY: CPT

## 2025-01-08 PROCEDURE — 97110 THERAPEUTIC EXERCISES: CPT

## 2025-01-08 PROCEDURE — 97112 NEUROMUSCULAR REEDUCATION: CPT

## 2025-01-08 NOTE — PROGRESS NOTES
"Daily Note     Today's date: 2025  Patient name: Gavin Morgan  : 1990  MRN: [id number]  Referring provider: Carmelo Ya PA-C  Dx:   Encounter Diagnosis     ICD-10-CM    1. S/P lumbar fusion  Z98.1       2. Chronic midline low back pain without sciatica  M54.50     G89.29                      Subjective: Pt reports his back muscles are sore.  Believes it is post exercise soreness.      Objective: See treatment diary below      Assessment: No progressions made today due to sx.  Primarily experiences this discomfort upon standing and correcting posture.  Sx do not limit patient from participation this session.  Static core stabilization improving, but still requiring VC for proper lumbopelvic motor control during unsupported spine positions.      Plan: Continue per plan of care.      Precautions: h/o previous discectomy; s/p lumbar fusion 24       [email or web address]  Access Code: [email or web address]      Manuals                                                        Neuro Re-Ed             TA   5\" x30   W bolster 3\"X20   Standing TA w PB 5\"x20  Standing TA w PB- marching 2x10  Standing TA w PB- 5\"x30     DLS w bolster push in HL  Deadbug 3x10 Deadbug x30 Deadbug w PB 2x10  Deadbug c PB 2x10 Deadbug w PB 2x10  Deadbug c PB 2x10 Deadbug w PB 2x10  Deadbug w PB 2x10  Deadbug w PB 2x10  Deadbug no PB 2x10 1# UE/LE   Prone hip ext    2x12         Clamshell              TA SLR   2x10  3x10  3x10 HEP       Bird dog 2x10 2# on UE/LE 2x10 3x10 2# on UE/LE    2x10 2x10  2x10  X10; 1# on UE/LE 2x10  2x10 1# on UE/LE 2x10   Paloff press [place] 16# 2x15 [place] 16# 2x15 Red x 20 ea  GTB 2x20 BTB 2x15 ea  BTB 2x15 Blue 2x15 ea    [place] 15# 2x15 ea  [place] 15# 2x15   Supine marching w TA    3\" x20  3\"x30         Shld ext w TB & SLB        Blue 2x15 ea  15# 2x10      plank 20\"x3 20\"x3        20\"x3   Bridge c march 3x10 3x12        3x10   Mod side plank " "c clamshell 3x10 C hip abd 2x10        3x10   Ther Ex             Pt Edu  AF AF KS AF KS AF KS KS KS AF   TM 10' 10'  8' 10'  8'  10'  10'   Recumbent bike w lumbar roll for endurance    8'     10'   Lv 5 10'     bridges   2x10  PB 3x10         S/l hip abd   2x10 ea  3x10 ea         Standing hip abd    Red 2x10 ea RTB 3x10 & ext  GTB 2x10 & ext Ext Green 2x10 ea       Kneeling hip flexor stretch    4x15\" ea  30\"x4   20\"X5   20\"x5 ea 20\"x5 ea   PB squat YMB 3x10 10# KB 3x10  2x12  2x15 2x15 2x10  2x10  3x10   Manual BLE stretching- HS, piri, add      15'  Self 30\"x4 HS/piri KS KS decline    Ther Activity                                       Gait Training             [place]es[place] side step 15# x10 ea 15# x10   10# x 10 ea 10# x10 Equipment unavailable 13# x 10 ea  15# x10 ea  15# x10 ea   [place] retrogait  23# x10 23# x10      20# x10  20# x10  20# x10   Modalities                                                      "

## 2025-01-09 ENCOUNTER — APPOINTMENT (OUTPATIENT)
Dept: PHYSICAL THERAPY | Facility: CLINIC | Age: 35
End: 2025-01-09
Payer: OTHER MISCELLANEOUS

## 2025-01-13 ENCOUNTER — OFFICE VISIT (OUTPATIENT)
Dept: PHYSICAL THERAPY | Facility: CLINIC | Age: 35
End: 2025-01-13
Payer: OTHER MISCELLANEOUS

## 2025-01-13 DIAGNOSIS — M54.50 CHRONIC MIDLINE LOW BACK PAIN WITHOUT SCIATICA: ICD-10-CM

## 2025-01-13 DIAGNOSIS — G89.29 CHRONIC MIDLINE LOW BACK PAIN WITHOUT SCIATICA: ICD-10-CM

## 2025-01-13 DIAGNOSIS — Z98.1 S/P LUMBAR FUSION: Primary | ICD-10-CM

## 2025-01-13 PROCEDURE — 97110 THERAPEUTIC EXERCISES: CPT

## 2025-01-13 PROCEDURE — 97112 NEUROMUSCULAR REEDUCATION: CPT

## 2025-01-13 NOTE — PROGRESS NOTES
Problem: Impaired Physical Mobility  Goal: Functional status is maintained or returned to baseline during hospitalization  Outcome: Monitoring/Evaluating progress     Problem: Pain  Goal: Acceptable pain level achieved/maintained at rest using appropriate pain scale for the patient  Outcome: Monitoring/Evaluating progress      "Daily Note     Today's date: 2025  Patient name: Gavin Morgan  : 1990  MRN: [id number]  Referring provider: Carmelo Ya PA-C  Dx:   Encounter Diagnosis     ICD-10-CM    1. S/P lumbar fusion  Z98.1       2. Chronic midline low back pain without sciatica  M54.50     G89.29                      Subjective: Pt reports he saw the surgeon on  who prescribed work hardening PT.  He reports 2 days ago he experienced LLE radicular sx into calf without any known cause.  He does report sitting in unsupported stool the day prior and playing music for prolonged period of time.  Pt also reports continued difficulty with standing straight after leaning over for prolonged period of time.      Objective: See treatment diary below      Assessment: b/l hamstring flexibility limitations remain.  Patient demonstrates improved flexibility with addition of dynamic hamstring stretching in supine.  Still requires VC to facilitate proper activation of core musculature.  Pt would benefit from continued PT in order to improve core stability and motor control for improved function during daily activities.  Pt instructed to continue HS stretching and dynamic HS stretch at home.      Plan: Continue per plan of care.      Precautions: h/o previous discectomy; s/p lumbar fusion 24       [email or web address]  Access Code: [email or web address]      Manuals   1                                                       Neuro Re-Ed             TA     W bolster 3\"X20   Standing TA w PB 5\"x20  Standing TA w PB- marching 2x10  Standing TA w PB- 5\"x30     DLS w bolster push in HL  Deadbug 3x10 Deadbug x30 Deadbug x30 Deadbug c PB 2x10 Deadbug w PB 2x10  Deadbug c PB 2x10 Deadbug w PB 2x10  Deadbug w PB 2x10  Deadbug w PB 2x10  Deadbug no PB 2x10 1# UE/LE   Prone hip ext    2x12         Clamshell              TA SLR    3x10  3x10 HEP       Bird dog 2x10 2# on UE/LE 2x10 3x10 2# on " "UE/LE 3x10 ea   2x10 2x10  2x10  X10; 1# on UE/LE 2x10  2x10 1# on UE/LE 2x10   Paloff press [place] 16# 2x15 [place] 16# 2x15  GTB 2x20 BTB 2x15 ea  BTB 2x15 Blue 2x15 ea    [place] 15# 2x15 ea  [place] 15# 2x15   Supine marching w TA     3\"x30         Shld ext w TB & SLB        Blue 2x15 ea  15# 2x10      plank 20\"x3 20\"x3 20\"x3       20\"x3   Bridge c march 3x10 3x12 3x12       3x10   Mod side plank c clamshell 3x10 C hip abd 2x10 C hip abd 2x10       3x10   Ther Ex             Pt Edu  AF AF AF AF KS AF KS KS KS AF   TM 10' 10' 10' 8' 10'  8'  10'  10'   Recumbent bike w lumbar roll for endurance        10'   Lv 5 10'     bridges    PB 3x10         S/l hip abd    3x10 ea         Standing hip abd     RTB 3x10 & ext  GTB 2x10 & ext Ext Green 2x10 ea       Kneeling hip flexor stretch     30\"x4   20\"X5   20\"x5 ea 20\"x5 ea   PB squat YMB 3x10 10# KB 3x10  2x12  2x15 2x15 2x10  2x10  3x10   Manual BLE stretching- HS, piri, add    Self HS 30\"x4 & dynamic HS in supine 2x20  15'  Self 30\"x4 HS/piri KS KS decline    Ther Activity                                       Gait Training             [place]es[place] side step 15# x10 ea 15# x10   10# x 10 ea 10# x10 Equipment unavailable 13# x 10 ea  15# x10 ea  15# x10 ea   [place] retrogait  23# x10 23# x10      20# x10  20# x10  20# x10   Modalities                                                        "

## 2025-01-16 ENCOUNTER — OFFICE VISIT (OUTPATIENT)
Dept: PHYSICAL THERAPY | Facility: CLINIC | Age: 35
End: 2025-01-16
Payer: OTHER MISCELLANEOUS

## 2025-01-16 DIAGNOSIS — M54.50 CHRONIC MIDLINE LOW BACK PAIN WITHOUT SCIATICA: ICD-10-CM

## 2025-01-16 DIAGNOSIS — G89.29 CHRONIC MIDLINE LOW BACK PAIN WITHOUT SCIATICA: ICD-10-CM

## 2025-01-16 DIAGNOSIS — Z98.1 S/P LUMBAR FUSION: Primary | ICD-10-CM

## 2025-01-16 PROCEDURE — 97112 NEUROMUSCULAR REEDUCATION: CPT | Performed by: PHYSICAL THERAPIST

## 2025-01-16 PROCEDURE — 97110 THERAPEUTIC EXERCISES: CPT | Performed by: PHYSICAL THERAPIST

## 2025-01-16 PROCEDURE — 97116 GAIT TRAINING THERAPY: CPT | Performed by: PHYSICAL THERAPIST

## 2025-01-16 NOTE — LETTER
2025    Carmelo Ya PA-C  [address] [address]    Patient: Gavin Morgan   YOB: 1990   Date of Visit: 2025     Encounter Diagnosis     ICD-10-CM    1. S/P lumbar fusion  Z98.1       2. Chronic midline low back pain without sciatica  M54.50     G89.29           Dear Dr. Ya:    Thank you for your recent referral of Gavin Morgan. Please review the attached evaluation summary from Gavin's recent visit.     Please verify that you agree with the plan of care by signing the attached order.     If you have any questions or concerns, please do not hesitate to call.     I sincerely appreciate the opportunity to share in the care of one of your patients and hope to have another opportunity to work with you in the near future.       Sincerely,    Christie Garrett, PT      Referring Provider:      I certify that I have read the below Plan of Care and certify the need for these services furnished under this plan of treatment while under my care.                    Carmelo Ya PA-C  [address] [address]  Via Fax: [phone]          PT Re-Evaluation     Today's date: 2025  Patient name: Gavin Morgan  : 1990  MRN: [id number]  Referring provider: Carmelo Ya PA-C  Dx:   Encounter Diagnosis     ICD-10-CM    1. S/P lumbar fusion  Z98.1       2. Chronic midline low back pain without sciatica  M54.50     G89.29                      Assessment  Impairments: abnormal or restricted ROM, activity intolerance, impaired physical strength, lacks appropriate home exercise program and pain with function    Assessment details: Patient is a 33 y/o male s/p lumbar fusion on 24.  He also has a history of disectomy in 2023.  Patient presents with decreased functional mobility due to  mildy increased pain, decreased hip and core strength, decreased lumbar  ROM, decreased  "endurance associated with lumbar fusion surgery.  Since starting therapy, he exhibits increased activity tolerance. However, he still presents with pain during certain positions/movements and he is not yet back to prior level of function.  He brought a script in last visit from his surgeon requesting \"work hardening\". Patient will benefit from continued  skilled physical therapy to address impairment and improve functional mobility.  PT needed to allow for return to maximal function and improve quality of life.   Understanding of Dx/Px/POC: good     Prognosis: good    Goals  STG within 4 weeks:   1. Patient to be independent in HEP. MET  2. Reduce pain by 50% to improve quality of life.  NOT MET   3. Improve hip strength to 5/5 in all planes. IN PROGRESS  LTG within 8 weeks:   1. Patient to be independent in ADLs/IADLs without difficulty. NOT MET   2. Patient to exhibit correct squat technique.  IN PROGRESS  3. Patient to be independent in comprehensive HEP. IN PROGRESS     Plan  Patient would benefit from: skilled physical therapy and PT eval  Planned modality interventions: cryotherapy, hydrotherapy and unattended electrical stimulation    Planned therapy interventions: therapeutic training, therapeutic exercise, therapeutic activities, stretching, strengthening, postural training, patient education, neuromuscular re-education, manual therapy, joint mobilization, IADL retraining, activity modification, ADL retraining, ADL training, body mechanics training, flexibility, functional ROM exercises, gait training, graded activity, graded exercise, graded motor, home exercise program and abdominal trunk stabilization    Frequency: 2x week  Duration in weeks: 6  Plan of Care beginning date: 1/16/2025  Plan of Care expiration date: 2/27/2025  Treatment plan discussed with: patient    Subjective Evaluation    History of Present Illness  Mechanism of injury: Patient is a 33 y/o male with chief complaints of chronic low back " "pain.   He had lumbar fusion on 24.  He had an L5-S1 discectomy in 2023. He was seen in PT, but due to persistent pain, his PT stopped and he returned back to surgeon.  Since surgery, he states his radiating pain has decreased.  He states he hasn't had much pain.  He was seen by physician, his restrictions were lifted (he reports), and he is referred for PT evaluation and treatment.      25: Patient reports one episode of pain last Saturday. 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 He reports pain going down his left leg.  It slowly decreased over the next few days.  Currently, he feels \"pretty good.\"  Any type of bending motion gives him pain (washing hands at sink, ducking down in basement with low ceiling), along with coughing and sneezing.  However, he admits that he is sitting with legs outstretched when he is coughing/sneezing with pain.   Patient Goals  Patient goal: \"To be done with all this.\"  Pain  At best pain ratin  At worst pain ratin  Quality: dull ache  Alleviating factors: standing up straight.  Exacerbated by: bending, squatting.    Social Support    Employment status: not working  Exercise history: walking the dog every day    Treatments  Previous treatment: physical therapy      Objective     Concurrent Complaints  Negative for bladder dysfunction, bowel dysfunction and saddle (S4) numbness    Active Range of Motion     Lumbar   Flexion:  Restriction level: moderate  Extension:  WFL  Left lateral flexion:  WFL  Right lateral flexion:  WFL    Strength/Myotome Testing     Left Hip   Planes of Motion   Flexion: 4+  Extension: 4+  Abduction: 4+  Adduction: 4+    Right Hip   Planes of Motion   Flexion: 4+  Extension: 4+  Abduction: 4+  Adduction: 4+    Left Knee   Flexion: 4+  Extension: 4+    Right Knee   Flexion: 4+  Extension: 4+    Left Ankle/Foot   Dorsiflexion: 4+  Great toe extension: 4+    Right Ankle/Foot " "  Dorsiflexion: 4+  Great toe extension: 4+             Precautions: h/o previous discectomy; s/p lumbar fusion 9/25/24       [email or web address]  Access Code: [email or web address]    POC expires Unit limit Auth Expiration date PT/OT/ST + Visit Limit?   2/27/25 12/31/25  99                            Visit/Unit Tracking  AUTH Status:  Date 1/2 1/6 1/8 1/13 1/16          N/a  Used 1 in 2025  2 3 4 5           Remaining                        Manuals 1/6 1/8 1/13 1/16 12/27 12/30 1/2                                                       Neuro Re-Ed             TA        Standing TA w PB- marching 2x10  Standing TA w PB- 5\"x30     DLS w bolster push in HL  Deadbug 3x10 Deadbug x30 Deadbug x30     Deadbug w PB 2x10  Deadbug w PB 2x10  Deadbug no PB 2x10 1# UE/LE   Prone hip ext             Clamshell              TA SLR             Bird dog 2x10 2# on UE/LE 2x10 3x10 2# on UE/LE 3x10 ea 2x10 ea slow     2x10  X10; 1# on UE/LE 2x10  2x10 1# on UE/LE 2x10   Paloff press Gomez 16# 2x15 Gomez 16# 2x15        Gomez 15# 2x15 ea  Gomez 15# 2x15   Supine marching w TA              Shld ext w TB & SLB         15# 2x10      plank 20\"x3 20\"x3 20\"x3 20\"X4       20\"x3   Bridge c march 3x10 3x12 3x12 3x10       3x10   Mod side plank c clamshell 3x10 C hip abd 2x10 C hip abd 2x10 W hip abd 2x10 ea      3x10   Ther Ex             Pt Edu  AF AF AF & re-eval KS    KS KS AF   TM 10' 10' 10' 10'     10'  10'   Recumbent bike w lumbar roll for endurance          Lv 5 10'     bridges             S/l hip abd             Standing hip abd              Kneeling hip flexor stretch          20\"x5 ea 20\"x5 ea   PB squat YMB 3x10 10# KB 3x10      2x10  2x10  3x10   Manual BLE stretching- HS, piri, add    Self HS 30\"x4 & dynamic HS in supine 2x20 Self HS 20\"x4 & piri 20\" x 4     KS decline    Supine sciatic  nerve glide    X20          Ther Activity                                       Gait Training             [place] side step 15# x10 " ea 15# x10  15# x10 ea     13# x 10 ea  15# x10 ea  15# x10 ea   [place] retrogait  23# x10 23# x10  23# 10     20# x10  20# x10  20# x10   Sled push & pull     50# 20 ft x 10          Modalities

## 2025-01-16 NOTE — PROGRESS NOTES
"PT Re-Evaluation     Today's date: 2025  Patient name: Gavin Morgan  : 1990  MRN: [id number]  Referring provider: Carmelo Ya PA-C  Dx:   Encounter Diagnosis     ICD-10-CM    1. S/P lumbar fusion  Z98.1       2. Chronic midline low back pain without sciatica  M54.50     G89.29                      Assessment  Impairments: abnormal or restricted ROM, activity intolerance, impaired physical strength, lacks appropriate home exercise program and pain with function    Assessment details: Patient is a 33 y/o male s/p lumbar fusion on 24.  He also has a history of disectomy in 2023.  Patient presents with decreased functional mobility due to  mildy increased pain, decreased hip and core strength, decreased lumbar  ROM, decreased endurance associated with lumbar fusion surgery.  Since starting therapy, he exhibits increased activity tolerance. However, he still presents with pain during certain positions/movements and he is not yet back to prior level of function.  He brought a script in last visit from his surgeon requesting \"work hardening\". Patient will benefit from continued  skilled physical therapy to address impairment and improve functional mobility.  PT needed to allow for return to maximal function and improve quality of life.   Understanding of Dx/Px/POC: good     Prognosis: good    Goals  STG within 4 weeks:   1. Patient to be independent in HEP. MET  2. Reduce pain by 50% to improve quality of life.  NOT MET   3. Improve hip strength to 5/5 in all planes. IN PROGRESS  LTG within 8 weeks:   1. Patient to be independent in ADLs/IADLs without difficulty. NOT MET   2. Patient to exhibit correct squat technique.  IN PROGRESS  3. Patient to be independent in comprehensive HEP. IN PROGRESS     Plan  Patient would benefit from: skilled physical therapy and PT eval  Planned modality interventions: cryotherapy, hydrotherapy and unattended electrical stimulation    Planned therapy " "interventions: therapeutic training, therapeutic exercise, therapeutic activities, stretching, strengthening, postural training, patient education, neuromuscular re-education, manual therapy, joint mobilization, IADL retraining, activity modification, ADL retraining, ADL training, body mechanics training, flexibility, functional ROM exercises, gait training, graded activity, graded exercise, graded motor, home exercise program and abdominal trunk stabilization    Frequency: 2x week  Duration in weeks: 6  Plan of Care beginning date: 2025  Plan of Care expiration date: 2025  Treatment plan discussed with: patient    Subjective Evaluation    History of Present Illness  Mechanism of injury: Patient is a 33 y/o male with chief complaints of chronic low back pain.   He had lumbar fusion on 24.  He had an L5-S1 discectomy in 2023. He was seen in PT, but due to persistent pain, his PT stopped and he returned back to surgeon.  Since surgery, he states his radiating pain has decreased.  He states he hasn't had much pain.  He was seen by physician, his restrictions were lifted (he reports), and he is referred for PT evaluation and treatment.      25: Patient reports one episode of pain last Saturday. 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 He reports pain going down his left leg.  It slowly decreased over the next few days.  Currently, he feels \"pretty good.\"  Any type of bending motion gives him pain (washing hands at sink, ducking down in basement with low ceiling), along with coughing and sneezing.  However, he admits that he is sitting with legs outstretched when he is coughing/sneezing with pain.   Patient Goals  Patient goal: \"To be done with all this.\"  Pain  At best pain ratin  At worst pain ratin  Quality: dull ache  Alleviating factors: standing up straight.  Exacerbated by: bending, squatting.    Social " "Support    Employment status: not working  Exercise history: walking the dog every day    Treatments  Previous treatment: physical therapy      Objective     Concurrent Complaints  Negative for bladder dysfunction, bowel dysfunction and saddle (S4) numbness    Active Range of Motion     Lumbar   Flexion:  Restriction level: moderate  Extension:  WFL  Left lateral flexion:  WFL  Right lateral flexion:  WFL    Strength/Myotome Testing     Left Hip   Planes of Motion   Flexion: 4+  Extension: 4+  Abduction: 4+  Adduction: 4+    Right Hip   Planes of Motion   Flexion: 4+  Extension: 4+  Abduction: 4+  Adduction: 4+    Left Knee   Flexion: 4+  Extension: 4+    Right Knee   Flexion: 4+  Extension: 4+    Left Ankle/Foot   Dorsiflexion: 4+  Great toe extension: 4+    Right Ankle/Foot   Dorsiflexion: 4+  Great toe extension: 4+             Precautions: h/o previous discectomy; s/p lumbar fusion 9/25/24       [email or web address]  Access Code: [email or web address]    POC expires Unit limit Auth Expiration date PT/OT/ST + Visit Limit?   2/27/25 12/31/25  99                            Visit/Unit Tracking  AUTH Status:  Date 1/2 1/6 1/8 1/13 1/16          N/a  Used 1 in 2025  2 3 4 5           Remaining                        Manuals 1/6 1/8 1/13 1/16 12/27 12/30 1/2                                                       Neuro Re-Ed             TA        Standing TA w PB- marching 2x10  Standing TA w PB- 5\"x30     DLS w bolster push in HL  Deadbug 3x10 Deadbug x30 Deadbug x30     Deadbug w PB 2x10  Deadbug w PB 2x10  Deadbug no PB 2x10 1# UE/LE   Prone hip ext             Clamshell              TA SLR             Bird dog 2x10 2# on UE/LE 2x10 3x10 2# on UE/LE 3x10 ea 2x10 ea slow     2x10  X10; 1# on UE/LE 2x10  2x10 1# on UE/LE 2x10   Paloff press Gomez 16# 2x15 Gomez 16# 2x15        Gomez 15# 2x15 ea  Gomez 15# 2x15   Supine marching w TA              Shld ext w TB & SLB         15# 2x10      plank 20\"x3 20\"x3 " "20\"x3 20\"X4       20\"x3   Bridge c march 3x10 3x12 3x12 3x10       3x10   Mod side plank c clamshell 3x10 C hip abd 2x10 C hip abd 2x10 W hip abd 2x10 ea      3x10   Ther Ex             Pt Edu  AF AF AF & re-eval KS    KS KS AF   TM 10' 10' 10' 10'     10'  10'   Recumbent bike w lumbar roll for endurance          Lv 5 10'     bridges             S/l hip abd             Standing hip abd              Kneeling hip flexor stretch          20\"x5 ea 20\"x5 ea   PB squat YMB 3x10 10# KB 3x10      2x10  2x10  3x10   Manual BLE stretching- HS, piri, add    Self HS 30\"x4 & dynamic HS in supine 2x20 Self HS 20\"x4 & piri 20\" x 4     KS decline    Supine sciatic  nerve glide    X20          Ther Activity                                       Gait Training             [place] side step 15# x10 ea 15# x10  15# x10 ea     13# x 10 ea  15# x10 ea  15# x10 ea   [place] retrogait  23# x10 23# x10  23# 10     20# x10  20# x10  20# x10   Sled push & pull     50# 20 ft x 10          Modalities                                                        "

## 2025-01-20 ENCOUNTER — APPOINTMENT (OUTPATIENT)
Dept: PHYSICAL THERAPY | Facility: CLINIC | Age: 35
End: 2025-01-20
Payer: OTHER MISCELLANEOUS

## 2025-01-21 ENCOUNTER — OFFICE VISIT (OUTPATIENT)
Dept: PHYSICAL THERAPY | Facility: CLINIC | Age: 35
End: 2025-01-21
Payer: OTHER MISCELLANEOUS

## 2025-01-21 DIAGNOSIS — Z98.1 S/P LUMBAR FUSION: Primary | ICD-10-CM

## 2025-01-21 DIAGNOSIS — M54.50 CHRONIC MIDLINE LOW BACK PAIN WITHOUT SCIATICA: ICD-10-CM

## 2025-01-21 DIAGNOSIS — G89.29 CHRONIC MIDLINE LOW BACK PAIN WITHOUT SCIATICA: ICD-10-CM

## 2025-01-21 PROCEDURE — 97530 THERAPEUTIC ACTIVITIES: CPT

## 2025-01-21 PROCEDURE — 97110 THERAPEUTIC EXERCISES: CPT

## 2025-01-21 PROCEDURE — 97112 NEUROMUSCULAR REEDUCATION: CPT

## 2025-01-21 NOTE — PROGRESS NOTES
"Daily Note     Today's date: 2025  Patient name: Gavin Morgan  : 1990  MRN: [id number]  Referring provider: Carmelo Ya PA-C  Dx:   Encounter Diagnosis     ICD-10-CM    1. S/P lumbar fusion  Z98.1       2. Chronic midline low back pain without sciatica  M54.50     G89.29                      Subjective: Pt reports lower back soreness for a few days following last session.        Objective: See treatment diary below      Assessment: Hamstring flexibility improving.  Core stabilization improves with VC to perform exercises with slower pace.  Requires VC to facilitate proper body mechanics with addition of lifting this session.  Pt would benefit from continued PT.      Plan: Continue per plan of care.      Precautions: h/o previous discectomy; s/p lumbar fusion 24       [email or web address]  Access Code: [email or web address]    POC expires Unit limit Auth Expiration date PT/OT/ST + Visit Limit?   25  99                            Visit/Unit Tracking  AUTH Status:  Date          N/a  Used 1 in   2 3 4 5 6          Remaining                          Manuals                                                          Neuro Re-Ed             TA        Standing TA w PB- marching 2x10  Standing TA w PB- 5\"x30     DLS w bolster push in HL  Deadbug 3x10 Deadbug x30 Deadbug x30     Deadbug w PB 2x10  Deadbug w PB 2x10  Deadbug no PB 2x10 1# UE/LE   Prone hip ext             Clamshell              TA SLR             Bird dog 2x10 2# on UE/LE 2x10 3x10 2# on UE/LE 3x10 ea 2x10 ea slow     2x10  X10; 1# on UE/LE 2x10  2x10 1# on UE/LE 2x10   Paloff press [place] 16# 2x15 [place] 16# 2x15        Gomez 15# 2x15 ea  Gomez 15# 2x15   Supine marching w TA              Shld ext w TB & SLB         15# 2x10      plank 20\"x3 20\"x3 20\"x3 20\"X4  20\"x4     20\"x3   Bridge c march 3x10 3x12 3x12 3x10  3x10     3x10   Mod side plank c " "clamshell 3x10 C hip abd 2x10 C hip abd 2x10 W hip abd 2x10 ea C hip abd 2x12     3x10   Ther Ex             Pt Edu  AF AF AF & re-eval KS AF   KS KS AF   TM 10' 10' 10' 10'  10'   10'  10'   Recumbent bike w lumbar roll for endurance          Lv 5 10'     bridges             S/l hip abd             Standing hip abd              Kneeling hip flexor stretch          20\"x5 ea 20\"x5 ea   PB squat YMB 3x10 10# KB 3x10      2x10  2x10  3x10   Manual BLE stretching- HS, piri, add    Self HS 30\"x4 & dynamic HS in supine 2x20 Self HS 20\"x4 & piri 20\" x 4  Self HS 30\"x4 & pirif x20   KS decline    Supine sciatic  nerve glide    X20  x20        Ther Activity                                         Gait Training             [place] side step 15# x10 ea 15# x10  15# x10 ea     13# x 10 ea  15# x10 ea  15# x10 ea   [place]omez retrogait  23# x10 23# x10  23# 10     20# x10  20# x10  20# x10   Sled push & pull     50# 20 ft x 10  50# 20' x10        RDL     15# kb 2x10        Modalities                                                             "

## 2025-01-23 ENCOUNTER — APPOINTMENT (OUTPATIENT)
Dept: PHYSICAL THERAPY | Facility: CLINIC | Age: 35
End: 2025-01-23
Payer: OTHER MISCELLANEOUS

## 2025-01-27 ENCOUNTER — OFFICE VISIT (OUTPATIENT)
Dept: PHYSICAL THERAPY | Facility: CLINIC | Age: 35
End: 2025-01-27
Payer: OTHER MISCELLANEOUS

## 2025-01-27 DIAGNOSIS — Z98.1 S/P LUMBAR FUSION: Primary | ICD-10-CM

## 2025-01-27 DIAGNOSIS — G89.29 CHRONIC MIDLINE LOW BACK PAIN WITHOUT SCIATICA: ICD-10-CM

## 2025-01-27 DIAGNOSIS — M54.50 CHRONIC MIDLINE LOW BACK PAIN WITHOUT SCIATICA: ICD-10-CM

## 2025-01-27 PROCEDURE — 97110 THERAPEUTIC EXERCISES: CPT

## 2025-01-27 PROCEDURE — 97112 NEUROMUSCULAR REEDUCATION: CPT

## 2025-01-27 PROCEDURE — 97530 THERAPEUTIC ACTIVITIES: CPT

## 2025-01-27 NOTE — PROGRESS NOTES
"Daily Note     Today's date: 2025  Patient name: Gavin Morgan  : 1990  MRN: [id number]  Referring provider: Carmelo Ya PA-C  Dx:   Encounter Diagnosis     ICD-10-CM    1. S/P lumbar fusion  Z98.1       2. Chronic midline low back pain without sciatica  M54.50     G89.29                      Subjective: Pt reports he has changed his sitting posture at home and has noticed an improvement in LE sx and LBP.        Objective: See treatment diary below      Assessment: Improved body mechanics with VC this session.  Performs charted interventions without c/o LBP.  Improving lumbopelvic motor control also present.  Pt would benefit from continued PT.      Plan: Continue per plan of care.      Precautions: h/o previous discectomy; s/p lumbar fusion 24       [email or web address]  Access Code: [email or web address]    POC expires Unit limit Auth Expiration date PT/OT/ST + Visit Limit?   25  99                            Visit/Unit Tracking  AUTH Status:  Date         N/a  Used 1 in   2 3 4 5 6 7         Remaining                          Manuals                                                          Neuro Re-Ed             TA        Standing TA w PB- marching 2x10  Standing TA w PB- 5\"x30     DLS w bolster push in HL  Deadbug 3x10 Deadbug x30 Deadbug x30     Deadbug w PB 2x10  Deadbug w PB 2x10  Deadbug no PB 2x10 1# UE/LE   Prone hip ext             Clamshell              TA SLR             Bird dog 2x10 2# on UE/LE 2x10 3x10 2# on UE/LE 3x10 ea 2x10 ea slow     2x10  X10; 1# on UE/LE 2x10  2x10 1# on UE/LE 2x10   Paloff press [place] 16# 2x15 Gomez 16# 2x15        Gomez 15# 2x15 ea  [place] 15# 2x15   Supine marching w TA              Shld ext w TB & SLB         15# 2x10      plank 20\"x3 20\"x3 20\"x3 20\"X4  20\"x4 30\"x3    20\"x3   Bridge c march 3x10 3x12 3x12 3x10  3x10 SL bridge 2x10    3x10   Mod side " "plank c clamshell 3x10 C hip abd 2x10 C hip abd 2x10 W hip abd 2x10 ea C hip abd 2x12 C hip abd 3X10    3x10   Ther Ex             Pt Edu  AF AF AF & re-eval KS AF   KS KS AF   TM 10' 10' 10' 10'  10' 10'  10'  10'   Recumbent bike w lumbar roll for endurance          Lv 5 10'     bridges             S/l hip abd             Standing hip abd              Kneeling hip flexor stretch          20\"x5 ea 20\"x5 ea   PB squat YMB 3x10 10# KB 3x10      2x10  2x10  3x10   Manual BLE stretching- HS, piri, add    Self HS 30\"x4 & dynamic HS in supine 2x20 Self HS 20\"x4 & piri 20\" x 4  Self HS 30\"x4 & pirif x20 Self HS 30\"x4 & pirif x20  KS decline    Supine sciatic  nerve glide    X20  x20 x20       Ther Activity                                         Gait Training             [place] side step 15# x10 ea 15# x10  15# x10 ea     13# x 10 ea  15# x10 ea  15# x10 ea   [place] retrogait  23# x10 23# x10  23# 10     20# x10  20# x10  20# x10   Sled push & pull     50# 20 ft x 10  50# 20' x10 75# 20' x10       RDL     15# kb 2x10 15# kb 2x12       Modalities                                                               "

## 2025-01-30 ENCOUNTER — OFFICE VISIT (OUTPATIENT)
Dept: PHYSICAL THERAPY | Facility: CLINIC | Age: 35
End: 2025-01-30
Payer: OTHER MISCELLANEOUS

## 2025-01-30 DIAGNOSIS — M54.50 CHRONIC MIDLINE LOW BACK PAIN WITHOUT SCIATICA: ICD-10-CM

## 2025-01-30 DIAGNOSIS — G89.29 CHRONIC MIDLINE LOW BACK PAIN WITHOUT SCIATICA: ICD-10-CM

## 2025-01-30 DIAGNOSIS — Z98.1 S/P LUMBAR FUSION: Primary | ICD-10-CM

## 2025-01-30 PROCEDURE — 97530 THERAPEUTIC ACTIVITIES: CPT | Performed by: PHYSICAL THERAPIST

## 2025-01-30 PROCEDURE — 97116 GAIT TRAINING THERAPY: CPT | Performed by: PHYSICAL THERAPIST

## 2025-01-30 PROCEDURE — 97112 NEUROMUSCULAR REEDUCATION: CPT | Performed by: PHYSICAL THERAPIST

## 2025-01-30 PROCEDURE — 97110 THERAPEUTIC EXERCISES: CPT | Performed by: PHYSICAL THERAPIST

## 2025-01-30 NOTE — PROGRESS NOTES
"Daily Note     Today's date: 2025  Patient name: Gavin Morgan  : 1990  MRN: [id number]  Referring provider: Carmelo Ya PA-C  Dx:   Encounter Diagnosis     ICD-10-CM    1. S/P lumbar fusion  Z98.1       2. Chronic midline low back pain without sciatica  M54.50     G89.29                      Subjective: Pt states he went to the dentist yesterday and walked the dog.  Later that day he had increased low back pain. He states, \"I didn't even do anything differently.\"       Objective: See treatment diary below      Assessment:  Patient is advised to pay attention to what causes increased pain at home.  He performs planks at low mat to see if this decreases \"day after\" soreness. He is challenged with entirety of core strengthening program. Verbal cueing for slow and controlled performance.       Plan: Continue per plan of care.      Precautions: h/o previous discectomy; s/p lumbar fusion 24       [email or web address]  Access Code: [email or web address]    POC expires Unit limit Auth Expiration date PT/OT/ST + Visit Limit?   25  99                            Visit/Unit Tracking  AUTH Status:  Date         N/a  Used 1 in   2 3 4 5 6 7 8        Remaining                          Manuals                                                              Neuro Re-Ed             TA             DLS w bolster push in HL  Deadbug 3x10 Deadbug x30 Deadbug x30          Prone hip ext             Clamshell              TA SLR             Bird dog 2x10 2# on UE/LE 2x10 3x10 2# on UE/LE 3x10 ea 2x10 ea slow    2# UE/2# LE 2x10       Paloff press [place] 16# 2x15 [place] 16# 2x15           Mini squat on overturned bosu ball        2x10       Low mat plank w hip abd taps        2x10       plank 20\"x3 20\"x3 20\"x3 20\"X4  20\"x4 30\"x3 Low mat 20\" x4       Bridge c march 3x10 3x12 3x12 3x10  3x10 SL bridge 2x10 SL Bridge 2x10       Mod side " "plank c clamshell 3x10 C hip abd 2x10 C hip abd 2x10 W hip abd 2x10 ea C hip abd 2x12 C hip abd 3X10 W hip abd 2x10 ea       Ther Ex             Pt Edu  AF AF AF & re-eval KS AF  KS      TM 10' 10' 10' 10'  10' 10' 10'       Recumbent bike w lumbar roll for endurance              bridges             S/l hip abd             Standing hip abd              Kneeling hip flexor stretch              PB squat YMB 3x10 10# KB 3x10           Manual BLE stretching- HS, piri, add    Self HS 30\"x4 & dynamic HS in supine 2x20 Self HS 20\"x4 & piri 20\" x 4  Self HS 30\"x4 & pirif x20 Self HS 30\"x4 & pirif x20 Self piri & H S 4x20\"       Supine sciatic  nerve glide    X20  x20 x20 X20       Ther Activity                                       Gait Training             [place]es[place] side step 15# x10 ea 15# x10  15# x10 ea    15# x15 ea       [place] retrogait  23# x10 23# x10  23# 10    23# x 15       Sled push & pull     50# 20 ft x 10  50# 20' x10 75# 20' x10 75# 20 ft x 10       RDL     15# kb 2x10 15# kb 2x12 15# KB 2x10       Modalities                                                               "

## 2025-02-07 ENCOUNTER — OFFICE VISIT (OUTPATIENT)
Dept: PHYSICAL THERAPY | Facility: CLINIC | Age: 35
End: 2025-02-07
Payer: OTHER MISCELLANEOUS

## 2025-02-07 DIAGNOSIS — M54.50 CHRONIC MIDLINE LOW BACK PAIN WITHOUT SCIATICA: ICD-10-CM

## 2025-02-07 DIAGNOSIS — G89.29 CHRONIC MIDLINE LOW BACK PAIN WITHOUT SCIATICA: ICD-10-CM

## 2025-02-07 DIAGNOSIS — Z98.1 S/P LUMBAR FUSION: Primary | ICD-10-CM

## 2025-02-07 PROCEDURE — 97110 THERAPEUTIC EXERCISES: CPT | Performed by: PHYSICAL THERAPIST

## 2025-02-07 PROCEDURE — 97116 GAIT TRAINING THERAPY: CPT | Performed by: PHYSICAL THERAPIST

## 2025-02-07 PROCEDURE — 97112 NEUROMUSCULAR REEDUCATION: CPT | Performed by: PHYSICAL THERAPIST

## 2025-02-07 NOTE — PROGRESS NOTES
"Daily Note     Today's date: 2025  Patient name: Gavin Morgan  : 1990  MRN: [id number]  Referring provider: Carmelo Ya PA-C  Dx:   Encounter Diagnosis     ICD-10-CM    1. S/P lumbar fusion  Z98.1       2. Chronic midline low back pain without sciatica  M54.50     G89.29                      Subjective: Patient states his low back has been improving lately attributed to being more aware of his posture.       Objective: See treatment diary below      Assessment:  Patient demonstrated appropriate level of challenge with activities; no c/o at completion of treatment session.       Plan: Continue per plan of care.      Precautions: h/o previous discectomy; s/p lumbar fusion 24       [email or web address]  Access Code: [email or web address]    POC expires Unit limit Auth Expiration date PT/OT/ST + Visit Limit?   25  99                            Visit/Unit Tracking  AUTH Status:  Date       N/a  Used 1 in   2 3 4 5 6 7 8 9       Remaining                          Manuals                                                            Neuro Re-Ed             TA             DLS w bolster push in HL  Deadbug 3x10 Deadbug x30 Deadbug x30          Prone hip ext             Clamshell              TA SLR             Bird dog 2x10 2# on UE/LE 2x10 3x10 2# on UE/LE 3x10 ea 2x10 ea slow    2# UE/2# LE 2x10  2# UE/ 2.5# LE 2x10      Paloff press [place] 16# 2x15 Gomez 16# 2x15           Mini squat on overturned bosu ball        2x10       Low mat plank w hip abd taps        2x10       plank 20\"x3 20\"x3 20\"x3 20\"X4  20\"x4 30\"x3 Low mat 20\" x4  Low mat 20\" x4      Bridge c march 3x10 3x12 3x12 3x10  3x10 SL bridge 2x10 SL Bridge 2x10  SL bridge 2x10 2.5#      Mod side plank c clamshell 3x10 C hip abd 2x10 C hip abd 2x10 W hip abd 2x10 ea C hip abd 2x12 C hip abd 3X10 W hip abd 2x10 ea  W hip abd 2x10 ea     Ther Ex       " "      Pt Edu  AF AF AF & re-eval KS AF  KS      TM 10' 10' 10' 10'  10' 10' 10'  10'     Recumbent bike w lumbar roll for endurance              bridges             S/l hip abd             Standing hip abd              Kneeling hip flexor stretch              PB squat YMB 3x10 10# KB 3x10           Manual BLE stretching- HS, piri, add    Self HS 30\"x4 & dynamic HS in supine 2x20 Self HS 20\"x4 & piri 20\" x 4  Self HS 30\"x4 & pirif x20 Self HS 30\"x4 & pirif x20 Self piri & H S 4x20\"  Self piri & H S 4x20\"      Supine sciatic  nerve glide    X20  x20 x20 X20  x20     Ther Activity                                       Gait Training             Kesier side step 15# x10 ea 15# x10  15# x10 ea    15# x15 ea  15# x15 ea      Gomez retrogait  23# x10 23# x10  23# 10    23# x 15  23# x 15      Sled push & pull     50# 20 ft x 10  50# 20' x10 75# 20' x10 75# 20 ft x 10  75# 20 ft x 10      RDL     15# kb 2x10 15# kb 2x12 15# KB 2x10  15# KB 2x10      Modalities                                                               "

## 2025-02-11 ENCOUNTER — OFFICE VISIT (OUTPATIENT)
Dept: PHYSICAL THERAPY | Facility: CLINIC | Age: 35
End: 2025-02-11
Payer: OTHER MISCELLANEOUS

## 2025-02-11 DIAGNOSIS — Z98.1 S/P LUMBAR FUSION: Primary | ICD-10-CM

## 2025-02-11 DIAGNOSIS — G89.29 CHRONIC MIDLINE LOW BACK PAIN WITHOUT SCIATICA: ICD-10-CM

## 2025-02-11 DIAGNOSIS — M54.50 CHRONIC MIDLINE LOW BACK PAIN WITHOUT SCIATICA: ICD-10-CM

## 2025-02-11 PROCEDURE — 97112 NEUROMUSCULAR REEDUCATION: CPT

## 2025-02-11 PROCEDURE — 97110 THERAPEUTIC EXERCISES: CPT

## 2025-02-11 PROCEDURE — 97530 THERAPEUTIC ACTIVITIES: CPT

## 2025-02-11 NOTE — PROGRESS NOTES
Daily Note     Today's date: 2025  Patient name: Gavin Morgan  : 1990  MRN: [id number]  Referring provider: Carmelo Ya PA-C  Dx:   Encounter Diagnosis     ICD-10-CM    1. S/P lumbar fusion  Z98.1       2. Chronic midline low back pain without sciatica  M54.50     G89.29                      Subjective: Pt reports he experienced an episode of L sciatica sx into posterior thigh.  He took Tylenol and the sx have been relieved.        Objective: See treatment diary below      Assessment: Still drops into lumbar lordosis during planking but is able to self correct with VC for TA activation.  He demonstrates good core stability when in supine position but has difficulty with stabilization during unsupported spine positions.  Completes strengthening without exacerbation of sx in session.  He is encouraged to continue his LE stretches and nerve glides at home, especially when he experiences pain.      Plan: Continue per plan of care.      Precautions: h/o previous discectomy; s/p lumbar fusion 24       [email or web address]  Access Code: [email or web address]    POC expires Unit limit Auth Expiration date PT/OT/ST + Visit Limit?   25  99                            Visit/Unit Tracking  AUTH Status:  Date      N/a  Used 1 in   2 3 4 5 6 7 8 9 10      Remaining                          Manuals                                                           Neuro Re-Ed             TA             DLS w bolster push in HL  Deadbug 3x10 Deadbug x30 Deadbug x30          Prone hip ext             Clamshell              TA SLR             Bird dog 2x10 2# on UE/LE 2x10 3x10 2# on UE/LE 3x10 ea 2x10 ea slow    2# UE/2# LE 2x10  2# UE/ 2.5# LE 2x10  3# UE/3# LE 2x12    Paloff press [place] 16# 2x15 [place] 16# 2x15       Keis 16# 2x20    Mini squat on overturned bosu ball        2x10       Low mat plank w hip abd  "taps        2x10       plank 20\"x3 20\"x3 20\"x3 20\"X4  20\"x4 30\"x3 Low mat 20\" x4  Low mat 20\" x4  Low mat 20\"x4    Bridge c march 3x10 3x12 3x12 3x10  3x10 SL bridge 2x10 SL Bridge 2x10  SL bridge 2x10 2.5#  SL bridge 3x10    Mod side plank c clamshell 3x10 C hip abd 2x10 C hip abd 2x10 W hip abd 2x10 ea C hip abd 2x12 C hip abd 3X10 W hip abd 2x10 ea  W hip abd 2x10 ea C hip abd 3x15    Dead bug         3x10    Ther Ex             Pt Edu  AF AF AF & re-eval KS AF  KS      TM 10' 10' 10' 10'  10' 10' 10'  10' 10'    Recumbent bike w lumbar roll for endurance              bridges             S/l hip abd             Standing hip abd              Kneeling hip flexor stretch              PB squat YMB 3x10 10# KB 3x10           Manual BLE stretching- HS, piri, add    Self HS 30\"x4 & dynamic HS in supine 2x20 Self HS 20\"x4 & piri 20\" x 4  Self HS 30\"x4 & pirif x20 Self HS 30\"x4 & pirif x20 Self piri & H S 4x20\"  Self piri & H S 4x20\"  home    Supine sciatic  nerve glide    X20  x20 x20 X20  x20 home    Ther Activity                                       Gait Training             Kesier side step 15# x10 ea 15# x10  15# x10 ea    15# x15 ea  15# x15 ea      Gomez retrogait  23# x10 23# x10  23# 10    23# x 15  23# x 15      Sled push & pull     50# 20 ft x 10  50# 20' x10 75# 20' x10 75# 20 ft x 10  75# 20 ft x 10  90# 6 laps    RDL     15# kb 2x10 15# kb 2x12 15# KB 2x10  15# KB 2x10  20# KB 3x10    Modalities                                                                 "

## 2025-02-14 ENCOUNTER — OFFICE VISIT (OUTPATIENT)
Dept: PHYSICAL THERAPY | Facility: CLINIC | Age: 35
End: 2025-02-14
Payer: OTHER MISCELLANEOUS

## 2025-02-14 DIAGNOSIS — M54.50 CHRONIC MIDLINE LOW BACK PAIN WITHOUT SCIATICA: ICD-10-CM

## 2025-02-14 DIAGNOSIS — G89.29 CHRONIC MIDLINE LOW BACK PAIN WITHOUT SCIATICA: ICD-10-CM

## 2025-02-14 DIAGNOSIS — Z98.1 S/P LUMBAR FUSION: Primary | ICD-10-CM

## 2025-02-14 PROCEDURE — 97112 NEUROMUSCULAR REEDUCATION: CPT | Performed by: PHYSICAL THERAPIST

## 2025-02-14 PROCEDURE — 97110 THERAPEUTIC EXERCISES: CPT | Performed by: PHYSICAL THERAPIST

## 2025-02-14 NOTE — PROGRESS NOTES
Daily Note     Today's date: 2025  Patient name: Gavin Morgan  : 1990  MRN: [id number]  Referring provider: Carmelo Ya PA-C  Dx:   Encounter Diagnosis     ICD-10-CM    1. S/P lumbar fusion  Z98.1       2. Chronic midline low back pain without sciatica  M54.50     G89.29                      Subjective: Pt states he has been doing well.  States he does not have any pain and reports that he is overall getting better and feeling stronger.      Objective: See treatment diary below      Assessment: He completes entirety of session without any increase of pain.  Progressed repetitions of sled walk.  Added weight to dead bugs without difficulty. Verbal cueing for slow and controlled movements during exercise.  Recommend continued physical therapy to continue to progress core stabilization especially during functional and higher level activity.      Plan: Continue per plan of care.      Precautions: h/o previous discectomy; s/p lumbar fusion 24       [email or web address]  Access Code: [email or web address]    POC expires Unit limit Auth Expiration date PT/OT/ST + Visit Limit?   25  99                            Visit/Unit Tracking  AUTH Status:  Date     N/a  Used 1 in   2 3 4 5 6 7 8 9 10 11     Remaining                          Manuals                                                          Neuro Re-Ed             TA             DLS w bolster push in HL  Deadbug 3x10 Deadbug x30 Deadbug x30          Prone hip ext             Clamshell              TA SLR             Bird dog 2x10 2# on UE/LE 2x10 3x10 2# on UE/LE 3x10 ea 2x10 ea slow    2# UE/2# LE 2x10  2# UE/ 2.5# LE 2x10  3# UE/3# LE 2x12 3# UE/3# LE 2x12   Paloff press [place] 16# 2x15 [place] 16# 2x15       Keis 16# 2x20 Keis 16# 2x20    Mini squat on overturned bosu ball        2x10       Low mat plank w hip abd taps         "2x10       plank 20\"x3 20\"x3 20\"x3 20\"X4  20\"x4 30\"x3 Low mat 20\" x4  Low mat 20\" x4  Low mat 20\"x4    Bridge c march 3x10 3x12 3x12 3x10  3x10 SL bridge 2x10 SL Bridge 2x10  SL bridge 2x10 2.5#  SL bridge 3x10    Mod side plank c clamshell 3x10 C hip abd 2x10 C hip abd 2x10 W hip abd 2x10 ea C hip abd 2x12 C hip abd 3X10 W hip abd 2x10 ea  W hip abd 2x10 ea C hip abd 3x15 C hip abd 3x15   Dead bug         3x10 3# UE & 3# LE deadbug    Ther Ex             Pt Edu  AF AF AF & re-eval KS AF  KS      TM 10' 10' 10' 10'  10' 10' 10'  10' 10' 10'    Recumbent bike w lumbar roll for endurance              bridges             S/l hip abd             Standing hip abd              Kneeling hip flexor stretch              PB squat YMB 3x10 10# KB 3x10           Manual BLE stretching- HS, piri, add    Self HS 30\"x4 & dynamic HS in supine 2x20 Self HS 20\"x4 & piri 20\" x 4  Self HS 30\"x4 & pirif x20 Self HS 30\"x4 & pirif x20 Self piri & H S 4x20\"  Self piri & H S 4x20\"  home    Supine sciatic  nerve glide    X20  x20 x20 X20  x20 home    Ther Activity                                       Gait Training             Kesier side step 15# x10 ea 15# x10  15# x10 ea    15# x15 ea  15# x15 ea   15# x15 ea    Fowler retrogait  23# x10 23# x10  23# 10    23# x 15  23# x 15   25# x15    Sled push & pull     50# 20 ft x 10  50# 20' x10 75# 20' x10 75# 20 ft x 10  75# 20 ft x 10  90# 6 laps 90# 10 laps    RDL     15# kb 2x10 15# kb 2x12 15# KB 2x10  15# KB 2x10  20# KB 3x10 20# KB 3x10    Modalities                                                                 "

## 2025-02-17 ENCOUNTER — OFFICE VISIT (OUTPATIENT)
Dept: PHYSICAL THERAPY | Facility: CLINIC | Age: 35
End: 2025-02-17
Payer: OTHER MISCELLANEOUS

## 2025-02-17 DIAGNOSIS — G89.29 CHRONIC MIDLINE LOW BACK PAIN WITHOUT SCIATICA: ICD-10-CM

## 2025-02-17 DIAGNOSIS — M54.50 CHRONIC MIDLINE LOW BACK PAIN WITHOUT SCIATICA: ICD-10-CM

## 2025-02-17 DIAGNOSIS — Z98.1 S/P LUMBAR FUSION: Primary | ICD-10-CM

## 2025-02-17 PROCEDURE — 97112 NEUROMUSCULAR REEDUCATION: CPT

## 2025-02-17 PROCEDURE — 97530 THERAPEUTIC ACTIVITIES: CPT

## 2025-02-17 PROCEDURE — 97110 THERAPEUTIC EXERCISES: CPT

## 2025-02-17 NOTE — PROGRESS NOTES
"Daily Note     Today's date: 2025  Patient name: Gavin Morgan  : 1990  MRN: [id number]  Referring provider: Carmelo Ya PA-C  Dx:   Encounter Diagnosis     ICD-10-CM    1. S/P lumbar fusion  Z98.1       2. Chronic midline low back pain without sciatica  M54.50     G89.29                      Subjective: Pt offers no new complaints upon arrival.      Objective: See treatment diary below      Assessment: Still does require VC to facilitate slower pace of exercise and to perform with increased motor control.  No complications with added progressions this session.  Some improvement in overall motor control during core stab.  Pt would benefit from continued PT.      Plan: Continue per plan of care.      Precautions: h/o previous discectomy; s/p lumbar fusion 24       [email or web address]  Access Code: [email or web address]    POC expires Unit limit Auth Expiration date PT/OT/ST + Visit Limit?   25  99                            Visit/Unit Tracking  AUTH Status:  Date    N/a  Used 1 in   2 3 4 5 6 7 8 9 10 11 12    Remaining                          Manuals                                                          Neuro Re-Ed             TA             DLS w bolster push in HL   Deadbug x30 Deadbug x30          Prone hip ext             Clamshell              TA SLR             Bird dog 3# UE/LE 3x10 3x10 2# on UE/LE 3x10 ea 2x10 ea slow    2# UE/2# LE 2x10  2# UE/ 2.5# LE 2x10  3# UE/3# LE 2x12 3# UE/3# LE 2x12   Paloff press [place] 16# 2x20 [place] 16# 2x15       Keis 16# 2x20 Keis 16# 2x20    Mini squat on overturned bosu ball        2x10       Low mat plank w hip abd taps        2x10       plank  20\"x3 20\"x3 20\"X4  20\"x4 30\"x3 Low mat 20\" x4  Low mat 20\" x4  Low mat 20\"x4    Bridge c march  3x12 3x12 3x10  3x10 SL bridge 2x10 SL Bridge 2x10  SL bridge 2x10 2.5#  SL bridge 3x10 " "   Mod side plank c clamshell  C hip abd 2x10 C hip abd 2x10 W hip abd 2x10 ea C hip abd 2x12 C hip abd 3X10 W hip abd 2x10 ea  W hip abd 2x10 ea C hip abd 3x15 C hip abd 3x15   Dead bug 3# UE & 3# LE        3x10 3# UE & 3# LE deadbug    Plank c shoulder taps 20\" box 3x10            Ther Ex             Pt Edu  AF AF AF & re-eval KS AF  KS      TM 10' 10' 10' 10'  10' 10' 10'  10' 10' 10'    Recumbent bike w lumbar roll for endurance              bridges             S/l hip abd             Standing hip abd              Kneeling hip flexor stretch              PB squat  10# KB 3x10           Manual BLE stretching- HS, piri, add    Self HS 30\"x4 & dynamic HS in supine 2x20 Self HS 20\"x4 & piri 20\" x 4  Self HS 30\"x4 & pirif x20 Self HS 30\"x4 & pirif x20 Self piri & H S 4x20\"  Self piri & H S 4x20\"  home    Supine sciatic  nerve glide    X20  x20 x20 X20  x20 home    Ther Activity                          U/l farmer's carry 25# x10            1/2 kneel chops 10# 2x10            Kesier side step  15# x10  15# x10 ea    15# x15 ea  15# x15 ea   15# x15 ea    [place] retrogait   23# x10  23# 10    23# x 15  23# x 15   25# x15    Sled push & pull  90# 6 laps   50# 20 ft x 10  50# 20' x10 75# 20' x10 75# 20 ft x 10  75# 20 ft x 10  90# 6 laps 90# 10 laps    RDL 25# 3x10    15# kb 2x10 15# kb 2x12 15# KB 2x10  15# KB 2x10  20# KB 3x10 20# KB 3x10    Modalities                                                                   "

## 2025-02-19 ENCOUNTER — OFFICE VISIT (OUTPATIENT)
Dept: PHYSICAL THERAPY | Facility: CLINIC | Age: 35
End: 2025-02-19
Payer: OTHER MISCELLANEOUS

## 2025-02-19 DIAGNOSIS — G89.29 CHRONIC MIDLINE LOW BACK PAIN WITHOUT SCIATICA: ICD-10-CM

## 2025-02-19 DIAGNOSIS — M54.50 CHRONIC MIDLINE LOW BACK PAIN WITHOUT SCIATICA: ICD-10-CM

## 2025-02-19 DIAGNOSIS — Z98.1 S/P LUMBAR FUSION: Primary | ICD-10-CM

## 2025-02-19 PROCEDURE — 97530 THERAPEUTIC ACTIVITIES: CPT

## 2025-02-19 PROCEDURE — 97112 NEUROMUSCULAR REEDUCATION: CPT

## 2025-02-19 PROCEDURE — 97110 THERAPEUTIC EXERCISES: CPT

## 2025-02-19 NOTE — PROGRESS NOTES
"Daily Note     Today's date: 2025  Patient name: Gavin Morgan  : 1990  MRN: [id number]  Referring provider: Carmelo Ya PA-C  Dx:   Encounter Diagnosis     ICD-10-CM    1. S/P lumbar fusion  Z98.1       2. Chronic midline low back pain without sciatica  M54.50     G89.29                      Subjective: Pt offers no new complaints upon arrival.      Objective: See treatment diary below      Assessment: Improved motor control with core stabilization.  He does still present with some glute weakness with single leg bridging.  Requiring less VC to facilitate proper exercise form.  Completes session without radicular sx/LBP.  Pt would benefit from continued PT.      Plan: Continue per plan of care.      Precautions: h/o previous discectomy; s/p lumbar fusion 24       [email or web address]  Access Code: [email or web address]    POC expires Unit limit Auth Expiration date PT/OT/ST + Visit Limit?   25  99                            Visit/Unit Tracking  AUTH Status:  Date    N/a  Used 13 2 3 4 5 6 7 8 9 10 11 12    Remaining                          Manuals                                                          Neuro Re-Ed             TA             DLS w bolster push in HL              Prone hip ext             Clamshell              TA SLR             Bird dog 3# UE/LE 3x10 3# UE/LE 3x10  2x10 ea slow    2# UE/2# LE 2x10  2# UE/ 2.5# LE 2x10  3# UE/3# LE 2x12 3# UE/3# LE 2x12   Paloff press [place] 16# 2x20 Keis 17# 2x20       Keis 16# 2x20 Keis 16# 2x20    Mini squat on overturned bosu ball        2x10       Low mat plank w hip abd taps        2x10       plank    20\"X4  20\"x4 30\"x3 Low mat 20\" x4  Low mat 20\" x4  Low mat 20\"x4    Bridge c march  SL bridge 2x10  3x10  3x10 SL bridge 2x10 SL Bridge 2x10  SL bridge 2x10 2.5#  SL bridge 3x10    Mod side plank c clamshell    W hip abd 2x10 " "ea C hip abd 2x12 C hip abd 3X10 W hip abd 2x10 ea  W hip abd 2x10 ea C hip abd 3x15 C hip abd 3x15   Dead bug 3# UE & 3# LE 3# UE/LE 3x12       3x10 3# UE & 3# LE deadbug    Plank c shoulder taps 20\" box 3x10 Low mat 3x10           Ther Ex             Pt Edu  AF   & re-eval KS AF  KS      TM 10' 10'  10'  10' 10' 10'  10' 10' 10'    Recumbent bike w lumbar roll for endurance              bridges             S/l hip abd             Standing hip abd              Kneeling hip flexor stretch              PB squat             Manual BLE stretching- HS, piri, add     Self HS 20\"x4 & piri 20\" x 4  Self HS 30\"x4 & pirif x20 Self HS 30\"x4 & pirif x20 Self piri & H S 4x20\"  Self piri & H S 4x20\"  home    Supine sciatic  nerve glide    X20  x20 x20 X20  x20 home    Ther Activity                          U/l farmer's carry 25# x10 25# 5 laps           1/2 kneel chops 10# 2x10 10# 2x10           Kesier side step    15# x10 ea    15# x15 ea  15# x15 ea   15# x15 ea    Gomez retrogait     23# 10    23# x 15  23# x 15   25# x15    Sled push & pull  90# 6 laps 100# 5 laps  50# 20 ft x 10  50# 20' x10 75# 20' x10 75# 20 ft x 10  75# 20 ft x 10  90# 6 laps 90# 10 laps    RDL 25# 3x10 25# 3x10   15# kb 2x10 15# kb 2x12 15# KB 2x10  15# KB 2x10  20# KB 3x10 20# KB 3x10    Modalities                                                                     "

## 2025-02-26 ENCOUNTER — OFFICE VISIT (OUTPATIENT)
Dept: PHYSICAL THERAPY | Facility: CLINIC | Age: 35
End: 2025-02-26
Payer: OTHER MISCELLANEOUS

## 2025-02-26 DIAGNOSIS — M54.50 CHRONIC MIDLINE LOW BACK PAIN WITHOUT SCIATICA: ICD-10-CM

## 2025-02-26 DIAGNOSIS — G89.29 CHRONIC MIDLINE LOW BACK PAIN WITHOUT SCIATICA: ICD-10-CM

## 2025-02-26 DIAGNOSIS — Z98.1 S/P LUMBAR FUSION: Primary | ICD-10-CM

## 2025-02-26 PROCEDURE — 97530 THERAPEUTIC ACTIVITIES: CPT | Performed by: PHYSICAL THERAPIST

## 2025-02-26 PROCEDURE — 97112 NEUROMUSCULAR REEDUCATION: CPT | Performed by: PHYSICAL THERAPIST

## 2025-02-26 PROCEDURE — 97110 THERAPEUTIC EXERCISES: CPT | Performed by: PHYSICAL THERAPIST

## 2025-03-03 ENCOUNTER — OFFICE VISIT (OUTPATIENT)
Dept: PHYSICAL THERAPY | Facility: CLINIC | Age: 35
End: 2025-03-03
Payer: OTHER MISCELLANEOUS

## 2025-03-03 DIAGNOSIS — G89.29 CHRONIC MIDLINE LOW BACK PAIN WITHOUT SCIATICA: ICD-10-CM

## 2025-03-03 DIAGNOSIS — M54.50 CHRONIC MIDLINE LOW BACK PAIN WITHOUT SCIATICA: ICD-10-CM

## 2025-03-03 DIAGNOSIS — Z98.1 S/P LUMBAR FUSION: Primary | ICD-10-CM

## 2025-03-03 PROCEDURE — 97112 NEUROMUSCULAR REEDUCATION: CPT

## 2025-03-03 PROCEDURE — 97530 THERAPEUTIC ACTIVITIES: CPT

## 2025-03-03 PROCEDURE — 97110 THERAPEUTIC EXERCISES: CPT

## 2025-03-03 NOTE — PROGRESS NOTES
"Daily Note     Today's date: 3/3/2025  Patient name: Gavin Morgan  : 1990  MRN: [id number]  Referring provider: Carmelo Ya PA-C  Dx:   Encounter Diagnosis     ICD-10-CM    1. S/P lumbar fusion  Z98.1       2. Chronic midline low back pain without sciatica  M54.50     G89.29                      Subjective: Pt denies complications following last session.        Objective: See treatment diary below      Assessment: Pt requires occasional VC to facilitate proper body mechanics with lowering objects to the ground.  He does experience stiffness in lower back with return to standing position while performing RDLs.  Will monitor this in future visits.  Pt would benefit from continued PT.      Plan: Continue per plan of care.      Precautions: h/o previous discectomy; s/p lumbar fusion 24       [email or web address]  Access Code: [email or web address]    POC expires Unit limit Auth Expiration date PT/OT/ST + Visit Limit?   25  99                            Visit/Unit Tracking  AUTH Status:  Date 2/19 2/26 3/3   1/21 1/27 1/30  2/7 2/11 2/14 2/17   N/a  Used 13 14 15   6 7 8 9 10 11 12    Remaining                          Manuals 2/17 2/19 2/26  3/3  1/27 1/30  2/7 2/11 2/14                                                       Neuro Re-Ed             TA             DLS w bolster push in HL              Prone hip ext             Clamshell              TA SLR             Bird dog 3# UE/LE 3x10 3# UE/LE 3x10 3# UE/LE 3x10  C gold TB 3x10   2# UE/2# LE 2x10  2# UE/ 2.5# LE 2x10  3# UE/3# LE 2x12 3# UE/3# LE 2x12   Paloff press [place] 16# 2x20 Keis 17# 2x20 Circuit: 25#  3 x 5       Keis 16# 2x20 Keis 16# 2x20    Mini squat on overturned bosu ball        2x10       Low mat plank w hip abd taps        2x10       plank      30\"x3 Low mat 20\" x4  Low mat 20\" x4  Low mat 20\"x4    Bridge c march  SL bridge 2x10    SL bridge 2x10 SL Bridge 2x10  SL bridge 2x10 2.5#  SL bridge 3x10    Mod side plank c " "clamshell      C hip abd 3X10 W hip abd 2x10 ea  W hip abd 2x10 ea C hip abd 3x15 C hip abd 3x15   Dead bug 3# UE & 3# LE 3# UE/LE 3x12       3x10 3# UE & 3# LE deadbug    Plank c shoulder taps 20\" box 3x10 Low mat 3x10 Low mat 3x10  Low mat 3x10         superman    5\"x10         Ther Ex             Pt Edu  AF  KS    KS      TM 10' 10' 10'  10'  10' 10'  10' 10' 10'    Recumbent bike w lumbar roll for endurance              bridges             S/l hip abd             Standing hip abd              Kneeling hip flexor stretch              PB squat             Manual BLE stretching- HS, piri, add       Self HS 30\"x4 & pirif x20 Self piri & H S 4x20\"  Self piri & H S 4x20\"  home    Supine sciatic  nerve glide      x20 X20  x20 home    Ther Activity             Box lift: floor to low mat    Circuit 25# 3x5           Box carry    Circuit 25# 3x5 laps  Circuit 5x1 lap         U/l farmer's carry 25# x10 25# 5 laps  Circuit 30# bucket 5x1 lap ea         1/2 kneel chops 10# 2x10 10# 2x10 10# 2x10 ea Circuit PMB 5x10         Kesier side step   Circuit 25# 3x5 ea    15# x15 ea  15# x15 ea   15# x15 ea    Gomez retrogait    Circuit: 25# 3x5     23# x 15  23# x 15   25# x15    Sled push & pull  90# 6 laps 100# 5 laps Circuit 100# 3x5 laps  Circuit 100# 5x1 lap  75# 20' x10 75# 20 ft x 10  75# 20 ft x 10  90# 6 laps 90# 10 laps    RDL 25# 3x10 25# 3x10 Home  25# 3x10  15# kb 2x12 15# KB 2x10  15# KB 2x10  20# KB 3x10 20# KB 3x10    Modalities                                                                       "

## 2025-03-05 ENCOUNTER — OFFICE VISIT (OUTPATIENT)
Dept: PHYSICAL THERAPY | Facility: CLINIC | Age: 35
End: 2025-03-05
Payer: OTHER MISCELLANEOUS

## 2025-03-05 DIAGNOSIS — Z98.1 S/P LUMBAR FUSION: Primary | ICD-10-CM

## 2025-03-05 DIAGNOSIS — G89.29 CHRONIC MIDLINE LOW BACK PAIN WITHOUT SCIATICA: ICD-10-CM

## 2025-03-05 DIAGNOSIS — M54.50 CHRONIC MIDLINE LOW BACK PAIN WITHOUT SCIATICA: ICD-10-CM

## 2025-03-05 PROCEDURE — 97530 THERAPEUTIC ACTIVITIES: CPT | Performed by: PHYSICAL THERAPIST

## 2025-03-05 PROCEDURE — 97110 THERAPEUTIC EXERCISES: CPT | Performed by: PHYSICAL THERAPIST

## 2025-03-05 PROCEDURE — 97112 NEUROMUSCULAR REEDUCATION: CPT | Performed by: PHYSICAL THERAPIST

## 2025-03-05 NOTE — LETTER
2025    Carmelo Ya PA-C  [address] [address]    Patient: Gavin Morgan   YOB: 1990   Date of Visit: 3/5/2025     Encounter Diagnosis     ICD-10-CM    1. S/P lumbar fusion  Z98.1       2. Chronic midline low back pain without sciatica  M54.50     G89.29           Dear Dr. Ya:    Thank you for your recent referral of Gavin Morgan. Please review the attached evaluation summary from Gavin's recent visit.     Please verify that you agree with the plan of care by signing the attached order.     If you have any questions or concerns, please do not hesitate to call.     I sincerely appreciate the opportunity to share in the care of one of your patients and hope to have another opportunity to work with you in the near future.       Sincerely,    Christie Garrett, PT      Referring Provider:      I certify that I have read the below Plan of Care and certify the need for these services furnished under this plan of treatment while under my care.                    Carmelo Ya PA-C  [address] [address]  Via Fax: [phone]          PT Re-Evaluation     Today's date: 3/5/2025  Patient name: Gavin Morgan  : 1990  MRN: [id number]  Referring provider: Carmelo Ya PA-C  Dx:   Encounter Diagnosis     ICD-10-CM    1. S/P lumbar fusion  Z98.1       2. Chronic midline low back pain without sciatica  M54.50     G89.29                      Assessment  Impairments: abnormal or restricted ROM, activity intolerance, impaired physical strength, lacks appropriate home exercise program and pain with function    Assessment details: Patient is a 35 y/o male s/p lumbar fusion on 24.  He also has a history of disectomy in 2023.  Patient exhibits improved functional mobility since starting therapy due to improved activity tolerance, improved strength, improved ROM.  He does  exhibit reduced flexion ROM and will benefit from continued hamstring stretching and also lumbar extension strengthening.  He does well with current circuit program and will benefit from continued therapy, focusing on achieving maximal potential.    Understanding of Dx/Px/POC: good     Prognosis: good    Goals  STG within 4 weeks:   1. Patient to be independent in HEP. MET  2. Reduce pain by 50% to improve quality of life.  NOT MET   3. Improve hip strength to 5/5 in all planes. IN PROGRESS  LTG within 8 weeks:   1. Patient to be independent in ADLs/IADLs without difficulty. NOT MET   2. Patient to exhibit correct squat technique.  IN PROGRESS  3. Patient to be independent in comprehensive HEP. IN PROGRESS     Plan  Patient would benefit from: skilled physical therapy and PT eval  Planned modality interventions: cryotherapy, hydrotherapy and unattended electrical stimulation    Planned therapy interventions: therapeutic training, therapeutic exercise, therapeutic activities, stretching, strengthening, postural training, patient education, neuromuscular re-education, manual therapy, joint mobilization, IADL retraining, activity modification, ADL retraining, ADL training, body mechanics training, flexibility, functional ROM exercises, gait training, graded activity, graded exercise, graded motor, home exercise program and abdominal trunk stabilization    Frequency: 2x week  Duration in weeks: 6  Plan of Care beginning date: 3/5/2025  Plan of Care expiration date: 4/16/2025  Treatment plan discussed with: patient      Subjective Evaluation    History of Present Illness  Mechanism of injury: Patient is a 35 y/o male with chief complaints of chronic low back pain.   He had lumbar fusion on 9/25/24.  He had an L5-S1 discectomy in November 2023. He was seen in PT, but due to persistent pain, his PT stopped and he returned back to surgeon.  Since surgery, he states his radiating pain has decreased.  He states he hasn't had  "much pain.  He was seen by physician, his restrictions were lifted (he reports), and he is referred for PT evaluation and treatment.      25: Patient reports one episode of pain last Saturday. 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 He reports pain going down his left leg.  It slowly decreased over the next few days.  Currently, he feels \"pretty good.\"  Any type of bending motion gives him pain (washing hands at sink, ducking down in basement with low ceiling), along with coughing and sneezing.  However, he admits that he is sitting with legs outstretched when he is coughing/sneezing with pain.     3/5/25: Patient states he is doing well except cannot bend down to pick things up without pain.  \"It takes me 30 seconds to stand up and for the pain to go away.\"   Patient Goals  Patient goal: \"To be done with all this.\"  Pain  At best pain ratin  At worst pain ratin  Quality: dull ache  Alleviating factors: standing up straight.  Exacerbated by: bending, squatting.    Social Support    Employment status: not working  Exercise history: walking the dog every day    Treatments  Previous treatment: physical therapy        Objective     Concurrent Complaints  Negative for bladder dysfunction, bowel dysfunction and saddle (S4) numbness    Active Range of Motion     Lumbar   Flexion:  Restriction level: moderate  Extension:  WFL  Left lateral flexion:  WFL  Right lateral flexion:  WFL    Strength/Myotome Testing     Left Hip   Planes of Motion   Flexion: 4+  Extension: 4+  Abduction: 4+  Adduction: 4+    Right Hip   Planes of Motion   Flexion: 4+  Extension: 4+  Abduction: 4+  Adduction: 4+    Left Knee   Flexion: 4+  Extension: 4+    Right Knee   Flexion: 4+  Extension: 4+    Left Ankle/Foot   Dorsiflexion: 4+  Great toe extension: 4+    Right Ankle/Foot   Dorsiflexion: 4+  Great toe extension: 4+             Precautions: h/o previous discectomy; s/p " "lumbar fusion 9/25/24       [email or web address]  Access Code: [email or web address]    POC expires Unit limit Auth Expiration date PT/OT/ST + Visit Limit?   4/16/25 12/31/25  99                              Visit/Unit Tracking  AUTH Status:  Date 2/19 2/26 3/3 3/5 1/21 1/27 1/30 2/7 2/11 2/14 2/17   N/a  Used 13 14 15 16  6 7 8 9 10 11 12    Remaining                          Manuals 2/17 2/19 2/26  3/3 3/5   2/7 2/11 2/14                                                       Neuro Re-Ed             TA             DLS w bolster push in HL              Prone hip ext             Clamshell              TA SLR             Bird dog 3# UE/LE 3x10 3# UE/LE 3x10 3# UE/LE 3x10  C gold TB 3x10    2# UE/ 2.5# LE 2x10  3# UE/3# LE 2x12 3# UE/3# LE 2x12   Paloff press Keis 16# 2x20 Keis 17# 2x20 Circuit: 25#  3 x 5   25# 3x10     Keis 16# 2x20 Keis 16# 2x20    Mini squat on overturned bosu ball              Low mat plank w hip abd taps              plank        Low mat 20\" x4  Low mat 20\"x4    Bridge c march  SL bridge 2x10      SL bridge 2x10 2.5#  SL bridge 3x10    Mod side plank c clamshell        W hip abd 2x10 ea C hip abd 3x15 C hip abd 3x15   Dead bug 3# UE & 3# LE 3# UE/LE 3x12       3x10 3# UE & 3# LE deadbug    Plank c shoulder taps 20\" box 3x10 Low mat 3x10 Low mat 3x10  Low mat 3x10 Circuit # 2: low mat 3x10         superman    5\"x10 Circuit #2 3x10         Ther Ex             Pt Edu  AF  KS  & re-eval         TM 10' 10' 10'  10' 10'    10' 10' 10'    HS stretch w strap      5x20\" ea         bridges             S/l hip abd             Standing hip abd              Kneeling hip flexor stretch              PB squat             Manual BLE stretching- HS, piri, add         Self piri & H S 4x20\"  home    Supine sciatic  nerve glide        x20 home    Ther Activity             Box lift: floor to low mat    Circuit 25# 3x5   Circuit #3: 20# 10+ 5 +5         Box carry    Circuit 25# 3x5 laps  Circuit 5x1 lap Circuit #3: " 2 laps x3         U/l farmer's carry 25# x10 25# 5 laps  Circuit 30# bucket 5x1 lap ea Circuit #3: 30# 2 laps x 3         1/2 kneel chops 10# 2x10 10# 2x10 10# 2x10 ea Circuit PMB 5x10         Kesier side step   Circuit 25# 3x5 ea     15# x15 ea   15# x15 ea    [place] retrogait    Circuit: 25# 3x5   Circuit #1 25# 3x10    23# x 15   25# x15    Sled push & pull  90# 6 laps 100# 5 laps Circuit 100# 3x5 laps  Circuit 100# 5x1 lap Circuit #1 100# 120# x 3    75# 20 ft x 10  90# 6 laps 90# 10 laps    RDL 25# 3x10 25# 3x10 Home  25# 3x10 Circuit #2: 25# 3x10    15# KB 2x10  20# KB 3x10 20# KB 3x10    1/2 kneel w outstretched arms      Circuit #1: Pink MB x5 ea side x 3                      Modalities                                                                                        Detail Level: Detailed

## 2025-03-05 NOTE — PROGRESS NOTES
PT Re-Evaluation     Today's date: 3/5/2025  Patient name: Gavin Morgan  : 1990  MRN: [id number]  Referring provider: Carmelo aY PA-C  Dx:   Encounter Diagnosis     ICD-10-CM    1. S/P lumbar fusion  Z98.1       2. Chronic midline low back pain without sciatica  M54.50     G89.29                      Assessment  Impairments: abnormal or restricted ROM, activity intolerance, impaired physical strength, lacks appropriate home exercise program and pain with function    Assessment details: Patient is a 33 y/o male s/p lumbar fusion on 24.  He also has a history of disectomy in 2023.  Patient exhibits improved functional mobility since starting therapy due to improved activity tolerance, improved strength, improved ROM.  He does exhibit reduced flexion ROM and will benefit from continued hamstring stretching and also lumbar extension strengthening.  He does well with current circuit program and will benefit from continued therapy, focusing on achieving maximal potential.    Understanding of Dx/Px/POC: good     Prognosis: good    Goals  STG within 4 weeks:   1. Patient to be independent in HEP. MET  2. Reduce pain by 50% to improve quality of life.  NOT MET   3. Improve hip strength to 5/5 in all planes. IN PROGRESS  LTG within 8 weeks:   1. Patient to be independent in ADLs/IADLs without difficulty. NOT MET   2. Patient to exhibit correct squat technique.  IN PROGRESS  3. Patient to be independent in comprehensive HEP. IN PROGRESS     Plan  Patient would benefit from: skilled physical therapy and PT eval  Planned modality interventions: cryotherapy, hydrotherapy and unattended electrical stimulation    Planned therapy interventions: therapeutic training, therapeutic exercise, therapeutic activities, stretching, strengthening, postural training, patient education, neuromuscular re-education, manual therapy, joint mobilization, IADL retraining, activity modification, ADL retraining, ADL training,  "body mechanics training, flexibility, functional ROM exercises, gait training, graded activity, graded exercise, graded motor, home exercise program and abdominal trunk stabilization    Frequency: 2x week  Duration in weeks: 6  Plan of Care beginning date: 3/5/2025  Plan of Care expiration date: 2025  Treatment plan discussed with: patient      Subjective Evaluation    History of Present Illness  Mechanism of injury: Patient is a 33 y/o male with chief complaints of chronic low back pain.   He had lumbar fusion on 24.  He had an L5-S1 discectomy in 2023. He was seen in PT, but due to persistent pain, his PT stopped and he returned back to surgeon.  Since surgery, he states his radiating pain has decreased.  He states he hasn't had much pain.  He was seen by physician, his restrictions were lifted (he reports), and he is referred for PT evaluation and treatment.      25: Patient reports one episode of pain last Saturday. He states this was after a day he was sitting on a high stool for a long time playing the [employer]. He reports pain going down his left leg.  It slowly decreased over the next few days.  Currently, he feels \"pretty good.\"  Any type of bending motion gives him pain (washing hands at sink, ducking down in basement with low ceiling), along with coughing and sneezing.  However, he admits that he is sitting with legs outstretched when he is coughing/sneezing with pain.     3/5/25: Patient states he is doing well except cannot bend down to pick things up without pain.  \"It takes me 30 seconds to stand up and for the pain to go away.\"   Patient Goals  Patient goal: \"To be done with all this.\"  Pain  At best pain ratin  At worst pain ratin  Quality: dull ache  Alleviating factors: standing up straight.  Exacerbated by: bending, squatting.    Social Support    Employment status: not working  Exercise history: walking the dog every day    Treatments  Previous treatment: physical " "therapy        Objective     Concurrent Complaints  Negative for bladder dysfunction, bowel dysfunction and saddle (S4) numbness    Active Range of Motion     Lumbar   Flexion:  Restriction level: moderate  Extension:  WFL  Left lateral flexion:  WFL  Right lateral flexion:  WFL    Strength/Myotome Testing     Left Hip   Planes of Motion   Flexion: 4+  Extension: 4+  Abduction: 4+  Adduction: 4+    Right Hip   Planes of Motion   Flexion: 4+  Extension: 4+  Abduction: 4+  Adduction: 4+    Left Knee   Flexion: 4+  Extension: 4+    Right Knee   Flexion: 4+  Extension: 4+    Left Ankle/Foot   Dorsiflexion: 4+  Great toe extension: 4+    Right Ankle/Foot   Dorsiflexion: 4+  Great toe extension: 4+             Precautions: h/o previous discectomy; s/p lumbar fusion 9/25/24       [email or web address]  Access Code: [email or web address]    POC expires Unit limit Auth Expiration date PT/OT/ST + Visit Limit?   4/16/25 12/31/25  99                              Visit/Unit Tracking  AUTH Status:  Date 2/19 2/26 3/3 3/5  1/21 1/27 1/30  2/7 2/11 2/14 2/17   N/a  Used 13 14 15 16  6 7 8 9 10 11 12    Remaining                          Manuals 2/17 2/19 2/26  3/3 3/5   2/7 2/11 2/14                                                       Neuro Re-Ed             TA             DLS w bolster push in HL              Prone hip ext             Clamshell              TA SLR             Bird dog 3# UE/LE 3x10 3# UE/LE 3x10 3# UE/LE 3x10  C gold TB 3x10    2# UE/ 2.5# LE 2x10  3# UE/3# LE 2x12 3# UE/3# LE 2x12   Paloff press Keis 16# 2x20 Keis 17# 2x20 Circuit: 25#  3 x 5   25# 3x10     Keis 16# 2x20 Keis 16# 2x20    Mini squat on overturned bosu ball              Low mat plank w hip abd taps              plank        Low mat 20\" x4  Low mat 20\"x4    Bridge c march  SL bridge 2x10      SL bridge 2x10 2.5#  SL bridge 3x10    Mod side plank c clamshell        W hip abd 2x10 ea C hip abd 3x15 C hip abd 3x15   Dead bug 3# UE & 3# LE 3# UE/LE " "3x12       3x10 3# UE & 3# LE deadbug    Plank c shoulder taps 20\" box 3x10 Low mat 3x10 Low mat 3x10  Low mat 3x10 Circuit # 2: low mat 3x10         superman    5\"x10 Circuit #2 3x10         Ther Ex             Pt Edu  AF  KS  & re-eval         TM 10' 10' 10'  10' 10'    10' 10' 10'    HS stretch w strap      5x20\" ea         bridges             S/l hip abd             Standing hip abd              Kneeling hip flexor stretch              PB squat             Manual BLE stretching- HS, piri, add         Self piri & H S 4x20\"  home    Supine sciatic  nerve glide        x20 home    Ther Activity             Box lift: floor to low mat    Circuit 25# 3x5   Circuit #3: 20# 10+ 5 +5         Box carry    Circuit 25# 3x5 laps  Circuit 5x1 lap Circuit #3: 2 laps x3         U/l farmer's carry 25# x10 25# 5 laps  Circuit 30# bucket 5x1 lap ea Circuit #3: 30# 2 laps x 3         1/2 kneel chops 10# 2x10 10# 2x10 10# 2x10 ea Circuit PMB 5x10         Kesier side step   Circuit 25# 3x5 ea     15# x15 ea   15# x15 ea    [place] retrogait    Circuit: 25# 3x5   Circuit #1 25# 3x10    23# x 15   25# x15    Sled push & pull  90# 6 laps 100# 5 laps Circuit 100# 3x5 laps  Circuit 100# 5x1 lap Circuit #1 100# 120# x 3    75# 20 ft x 10  90# 6 laps 90# 10 laps    RDL 25# 3x10 25# 3x10 Home  25# 3x10 Circuit #2: 25# 3x10    15# KB 2x10  20# KB 3x10 20# KB 3x10    1/2 kneel w outstretched arms      Circuit #1: Pink MB x5 ea side x 3                      Modalities                                                                       "

## 2025-03-10 ENCOUNTER — OFFICE VISIT (OUTPATIENT)
Dept: PHYSICAL THERAPY | Facility: CLINIC | Age: 35
End: 2025-03-10
Payer: OTHER MISCELLANEOUS

## 2025-03-10 DIAGNOSIS — Z98.1 S/P LUMBAR FUSION: Primary | ICD-10-CM

## 2025-03-10 DIAGNOSIS — G89.29 CHRONIC MIDLINE LOW BACK PAIN WITHOUT SCIATICA: ICD-10-CM

## 2025-03-10 DIAGNOSIS — M54.50 CHRONIC MIDLINE LOW BACK PAIN WITHOUT SCIATICA: ICD-10-CM

## 2025-03-10 PROCEDURE — 97112 NEUROMUSCULAR REEDUCATION: CPT | Performed by: PHYSICAL THERAPIST

## 2025-03-10 PROCEDURE — 97530 THERAPEUTIC ACTIVITIES: CPT | Performed by: PHYSICAL THERAPIST

## 2025-03-10 PROCEDURE — 97110 THERAPEUTIC EXERCISES: CPT | Performed by: PHYSICAL THERAPIST

## 2025-03-10 NOTE — PROGRESS NOTES
"Daily Note     Today's date: 3/10/2025  Patient name: Gavin Morgan  : 1990  MRN: [id number]  Referring provider: Carmelo Ya PA-C  Dx:   Encounter Diagnosis     ICD-10-CM    1. S/P lumbar fusion  Z98.1       2. Chronic midline low back pain without sciatica  M54.50     G89.29                      Subjective: Patient states he did a lot of yard work this weekend without pain. States he still can't do a lot of bending to  sticks/rocks.       Objective: See treatment diary below      Assessment: Tolerated treatment well. He continues to respond well to work hardening tasks.  No complaints of pain during or post session. He is shown standing HS stretch, as this musculature remains tight bilaterally. This visit, he is able to perform plank without pain, which is improved from previous.  Patient would benefit from continued PT      Plan: Continue per plan of care.      Precautions: h/o previous discectomy; s/p lumbar fusion 24       [email or web address]  Access Code: [email or web address]    POC expires Unit limit Auth Expiration date PT/OT/ST + Visit Limit?   25  99                              Visit/Unit Tracking  AUTH Status:  Date 2/19 2/26 3/3 3/5 3/10     2/7 2/11 2/14 2/17   N/a  Used 13 14 15 16 17     9 10 11 12    Remaining                          Manuals 2/17 2/19 2/26  3/3 3/5 3/10   2/7 2/11 2/14                                                       Neuro Re-Ed                          DLS w bolster push in HL              Prone hip ext             Clamshell              TA SLR             Bird dog 3# UE/LE 3x10 3# UE/LE 3x10 3# UE/LE 3x10  C gold TB 3x10    2# UE/ 2.5# LE 2x10  3# UE/3# LE 2x12 3# UE/3# LE 2x12   Paloff press Keis 16# 2x20 Keis 17# 2x20 Circuit: 25#  3 x 5   25# 3x10  Circuit #1: 3x10    Keis 16# 2x20 Keis 16# 2x20    Mini squat on overturned bosu ball              Low mat plank w hip abd taps              plank      3x20\"   Low mat 20\" x4  Low mat " "20\"x4    Bridge c march  SL bridge 2x10      SL bridge 2x10 2.5#  SL bridge 3x10    Mod side plank c clamshell        W hip abd 2x10 ea C hip abd 3x15 C hip abd 3x15   Dead bug 3# UE & 3# LE 3# UE/LE 3x12       3x10 3# UE & 3# LE deadbug    Plank c shoulder taps 20\" box 3x10 Low mat 3x10 Low mat 3x10  Low mat 3x10 Circuit # 2: low mat 3x10         superman    5\"x10 Circuit #2 3x10  Circuit #2: 3x10        Ther Ex             Pt Edu  AF  KS  & re-eval         TM 10' 10' 10'  10' 10'  10'   10' 10' 10'    HS stretch w strap      5x20\" ea  3x20\" ea        HS stretch at step       2x20\" ea        S/l hip abd             Standing hip abd              Kneeling hip flexor stretch              PB squat             Manual BLE stretching- HS, piri, add         Self piri & H S 4x20\"  home    Supine sciatic  nerve glide        x20 home    Ther Activity             Box lift: floor to low mat    Circuit 25# 3x5   Circuit #3: 20# 10+ 5 +5  Circuit #1: 10+10+5        Box carry    Circuit 25# 3x5 laps  Circuit 5x1 lap Circuit #3: 2 laps x3  Circuit #2: 1+5 +5        U/l farmer's carry 25# x10 25# 5 laps  Circuit 30# bucket 5x1 lap ea Circuit #3: 30# 2 laps x 3         1/2 kneel chops 10# 2x10 10# 2x10 10# 2x10 ea Circuit PMB 5x10         Kesier side step   Circuit 25# 3x5 ea     15# x15 ea   15# x15 ea    [place] retrogait    Circuit: 25# 3x5   Circuit #1 25# 3x10  Circuit #1: 29#  3x10   23# x 15   25# x15    Sled push & pull  90# 6 laps 100# 5 laps Circuit 100# 3x5 laps  Circuit 100# 5x1 lap Circuit #1 100# 120# x 3  Circuit #1: 60 ft x 1 + 5 +5    75# 20 ft x 10  90# 6 laps 90# 10 laps    RDL 25# 3x10 25# 3x10 Home  25# 3x10 Circuit #2: 25# 3x10  Circuit #2: 15# 3x10   15# KB 2x10  20# KB 3x10 20# KB 3x10    1/2 kneel w outstretched arms      Circuit #1: Pink MB x5 ea side x 3  Circuit #1: Pink MB x10 ea side x 3                     Modalities                                                                            "

## 2025-03-12 ENCOUNTER — OFFICE VISIT (OUTPATIENT)
Dept: PHYSICAL THERAPY | Facility: CLINIC | Age: 35
End: 2025-03-12
Payer: OTHER MISCELLANEOUS

## 2025-03-12 DIAGNOSIS — M54.50 CHRONIC MIDLINE LOW BACK PAIN WITHOUT SCIATICA: ICD-10-CM

## 2025-03-12 DIAGNOSIS — G89.29 CHRONIC MIDLINE LOW BACK PAIN WITHOUT SCIATICA: ICD-10-CM

## 2025-03-12 DIAGNOSIS — Z98.1 S/P LUMBAR FUSION: Primary | ICD-10-CM

## 2025-03-12 PROCEDURE — 97110 THERAPEUTIC EXERCISES: CPT

## 2025-03-12 PROCEDURE — 97530 THERAPEUTIC ACTIVITIES: CPT

## 2025-03-12 PROCEDURE — 97112 NEUROMUSCULAR REEDUCATION: CPT

## 2025-03-12 NOTE — PROGRESS NOTES
"Daily Note     Today's date: 3/12/2025  Patient name: Gavin Morgan  : 1990  MRN: [id number]  Referring provider: Carmelo Ya PA-C  Dx:   Encounter Diagnosis     ICD-10-CM    1. S/P lumbar fusion  Z98.1       2. Chronic midline low back pain without sciatica  M54.50     G89.29                      Subjective: Pt reports he is feeling \"really good\" upon arrival to session.      Objective: See treatment diary below      Assessment: Demonstrates an improvement in lumbopelvic stabilization while performing dynamic core exercises.  Body mechanics improving overall.  Demonstrates fatigue post session, although improvements in muscular endurance noted.  Pt would benefit from continued PT.      Plan: Continue per plan of care.      Precautions: h/o previous discectomy; s/p lumbar fusion 24       [email or web address]  Access Code: [email or web address]    POC expires Unit limit Auth Expiration date PT/OT/ST + Visit Limit?   25  99                              Visit/Unit Tracking  AUTH Status:  Date 2/19 2/26 3/3 3/5 3/10  3/12   2/7 2/11 2/14 2/17   N/a  Used 13 14 15 16 17  18   9 10 11 12    Remaining                          Manuals 2/17 2/19 2/26  3/3 3/5 3/10  3/12  2/11 2/14                                                       Neuro Re-Ed                          DLS w bolster push in HL              Prone hip ext             Clamshell              TA SLR             Bird dog 3# UE/LE 3x10 3# UE/LE 3x10 3# UE/LE 3x10  C gold TB 3x10   C DB row 20# 3x12  3# UE/3# LE 2x12 3# UE/3# LE 2x12   Paloff press Keis 16# 2x20 Keis 17# 2x20 Circuit: 25#  3 x 5   25# 3x10  Circuit #1: 3x10  Circuit #1 20# 1/2 kneel 3x10  Keis 16# 2x20 Keis 16# 2x20    Mini squat on overturned bosu ball              Low mat plank w hip abd taps              plank      3x20\"  Bear plank c KB drag 25#  Low mat 20\"x4    Bridge c march  SL bridge 2x10       SL bridge 3x10    Mod side plank c clamshell         C hip abd " "3x15 C hip abd 3x15   Dead bug 3# UE & 3# LE 3# UE/LE 3x12       3x10 3# UE & 3# LE deadbug    Plank c shoulder taps 20\" box 3x10 Low mat 3x10 Low mat 3x10  Low mat 3x10 Circuit # 2: low mat 3x10   Plank c KBHE circuit #1: 3x10      superman    5\"x10 Circuit #2 3x10  Circuit #2: 3x10  Circuit #2 5\" 3x10      Ther Ex             Pt Edu  AF  KS  & re-eval         TM 10' 10' 10'  10' 10'  10'  10'  10' 10'    HS stretch w strap      5x20\" ea  3x20\" ea        HS stretch at step       2x20\" ea        S/l hip abd             Standing hip abd              Kneeling hip flexor stretch              PB squat             Manual BLE stretching- HS, piri, add          home    Supine sciatic  nerve glide         home    Ther Activity             Box lift: floor to low mat    Circuit 25# 3x5   Circuit #3: 20# 10+ 5 +5  Circuit #1: 10+10+5        Box carry    Circuit 25# 3x5 laps  Circuit 5x1 lap Circuit #3: 2 laps x3  Circuit #2: 1+5 +5        U/l farmer's carry 25# x10 25# 5 laps  Circuit 30# bucket 5x1 lap ea Circuit #3: 30# 2 laps x 3   Circuit #1:  40# bucket 2 laps x3      1/2 kneel chops 10# 2x10 10# 2x10 10# 2x10 ea Circuit PMB 5x10   Circuit #2 keis 15# 3x10      Kesier side step   Circuit 25# 3x5 ea       15# x15 ea    [place] retrogait    Circuit: 25# 3x5   Circuit #1 25# 3x10  Circuit #1: 29#  3x10     25# x15    Sled push & pull  90# 6 laps 100# 5 laps Circuit 100# 3x5 laps  Circuit 100# 5x1 lap Circuit #1 100# 120# x 3  Circuit #1: 60 ft x 1 + 5 +5   Circuit #2: 120# 75' 3x2 laps ea  90# 6 laps 90# 10 laps    RDL 25# 3x10 25# 3x10 Home  25# 3x10 Circuit #2: 25# 3x10  Circuit #2: 15# 3x10  Circuit #1 25# 3x12  20# KB 3x10 20# KB 3x10    1/2 kneel w outstretched arms      Circuit #1: Pink MB x5 ea side x 3  Circuit #1: Pink MB x10 ea side x 3                     Modalities                                                                              "

## 2025-03-17 ENCOUNTER — OFFICE VISIT (OUTPATIENT)
Dept: PHYSICAL THERAPY | Facility: CLINIC | Age: 35
End: 2025-03-17
Payer: OTHER MISCELLANEOUS

## 2025-03-17 DIAGNOSIS — G89.29 CHRONIC MIDLINE LOW BACK PAIN WITHOUT SCIATICA: ICD-10-CM

## 2025-03-17 DIAGNOSIS — Z98.1 S/P LUMBAR FUSION: Primary | ICD-10-CM

## 2025-03-17 DIAGNOSIS — M54.50 CHRONIC MIDLINE LOW BACK PAIN WITHOUT SCIATICA: ICD-10-CM

## 2025-03-17 PROCEDURE — 97110 THERAPEUTIC EXERCISES: CPT

## 2025-03-17 PROCEDURE — 97530 THERAPEUTIC ACTIVITIES: CPT

## 2025-03-17 PROCEDURE — 97112 NEUROMUSCULAR REEDUCATION: CPT

## 2025-03-17 NOTE — PROGRESS NOTES
"Daily Note     Today's date: 3/17/2025  Patient name: Gavin Morgan  : 1990  MRN: [id number]  Referring provider: Carmelo Ya PA-C  Dx:   Encounter Diagnosis     ICD-10-CM    1. S/P lumbar fusion  Z98.1       2. Chronic midline low back pain without sciatica  M54.50     G89.29                      Subjective: Pt denies pain in low back and LE upon arrival to session.  Denies complications following last visit.      Objective: See treatment diary below      Assessment: Continues to demonstrate improving lumbopelvic motor control.  Pt demonstrates fatigue post session.  Requires min A VC to facilitate proper body mechanics this session.  Pt would benefit from continued PT.      Plan: Continue per plan of care.      Precautions: h/o previous discectomy; s/p lumbar fusion 24       [email or web address]  Access Code: [email or web address]    POC expires Unit limit Auth Expiration date PT/OT/ST + Visit Limit?   25  99                              Visit/Unit Tracking  AUTH Status:  Date 2/19 2/26 3/3 3/5 3/10  3/12 3/17  2/7 2/11 2/14 2/17   N/a  Used 13 14 15 16 17  18 19  9 10 11 12    Remaining                          Manuals 2/17 2/19 2/26  3/3 3/5 3/10  3/12 3/17  2/14                                                       Neuro Re-Ed                          DLS w bolster push in HL              Prone hip ext             Clamshell              TA SLR             Bird dog 3# UE/LE 3x10 3# UE/LE 3x10 3# UE/LE 3x10  C gold TB 3x10   C DB row 20# 3x12 C KB row 25# 3x10  3# UE/3# LE 2x12   Paloff press Keis 16# 2x20 Keis 17# 2x20 Circuit: 25#  3 x 5   25# 3x10  Circuit #1: 3x10  Circuit #1 20# 1/2 kneel 3x10   Keis 16# 2x20    Mini squat on overturned bosu ball              Low mat plank w hip abd taps              plank      3x20\"  Bear plank c KB drag 25# Bear plank c KB drag 25# 3x10     Bridge c march  SL bridge 2x10           Mod side plank c clamshell          C hip abd 3x15   Dead " "bug 3# UE & 3# LE 3# UE/LE 3x12        3# UE & 3# LE deadbug    Plank c shoulder taps 20\" box 3x10 Low mat 3x10 Low mat 3x10  Low mat 3x10 Circuit # 2: low mat 3x10   Plank c KBHE circuit #1: 3x10 Plank c KBHE circuit #1: 3x10     superman    5\"x10 Circuit #2 3x10  Circuit #2: 3x10  Circuit #2 5\" 3x10 Circuit #2: 3x10\"x10      Ther Ex             Pt Edu  AF  KS  & re-eval         TM 10' 10' 10'  10' 10'  10'  10' 10'  10'    HS stretch w strap      5x20\" ea  3x20\" ea        HS stretch at step       2x20\" ea        S/l hip abd             Standing hip abd              Kneeling hip flexor stretch              PB squat             Manual BLE stretching- HS, piri, add              Supine sciatic  nerve glide             Ther Activity             Box lift: floor to low mat    Circuit 25# 3x5   Circuit #3: 20# 10+ 5 +5  Circuit #1: 10+10+5        Box carry    Circuit 25# 3x5 laps  Circuit 5x1 lap Circuit #3: 2 laps x3  Circuit #2: 1+5 +5        U/l farmer's carry 25# x10 25# 5 laps  Circuit 30# bucket 5x1 lap ea Circuit #3: 30# 2 laps x 3   Circuit #1:  40# bucket 2 laps x3 Circuit #2: 40# bucket 2 laps x3     1/2 kneel chops 10# 2x10 10# 2x10 10# 2x10 ea Circuit PMB 5x10   Circuit #2 keis 15# 3x10 Circuit #1 c rotation keis 10# 3x10     Kesier side step   Circuit 25# 3x5 ea       15# x15 ea    [place] retrogait    Circuit: 25# 3x5   Circuit #1 25# 3x10  Circuit #1: 29#  3x10     25# x15    Sled push & pull  90# 6 laps 100# 5 laps Circuit 100# 3x5 laps  Circuit 100# 5x1 lap Circuit #1 100# 120# x 3  Circuit #1: 60 ft x 1 + 5 +5   Circuit #2: 120# 75' 3x2 laps ea Circuit #1 120# 75' 3x2 laps ea  90# 10 laps    RDL 25# 3x10 25# 3x10 Home  25# 3x10 Circuit #2: 25# 3x10  Circuit #2: 15# 3x10  Circuit #1 25# 3x12 Circuit #2: 40# bucket 3x10  20# KB 3x10    1/2 kneel w outstretched arms      Circuit #1: Pink MB x5 ea side x 3  Circuit #1: Pink MB x10 ea side x 3                     Modalities                                     "

## 2025-03-19 ENCOUNTER — OFFICE VISIT (OUTPATIENT)
Dept: PHYSICAL THERAPY | Facility: CLINIC | Age: 35
End: 2025-03-19
Payer: OTHER MISCELLANEOUS

## 2025-03-19 DIAGNOSIS — Z98.1 S/P LUMBAR FUSION: Primary | ICD-10-CM

## 2025-03-19 DIAGNOSIS — G89.29 CHRONIC MIDLINE LOW BACK PAIN WITHOUT SCIATICA: ICD-10-CM

## 2025-03-19 DIAGNOSIS — M54.50 CHRONIC MIDLINE LOW BACK PAIN WITHOUT SCIATICA: ICD-10-CM

## 2025-03-19 PROCEDURE — 97110 THERAPEUTIC EXERCISES: CPT | Performed by: PHYSICAL THERAPIST

## 2025-03-19 PROCEDURE — 97530 THERAPEUTIC ACTIVITIES: CPT | Performed by: PHYSICAL THERAPIST

## 2025-03-19 PROCEDURE — 97112 NEUROMUSCULAR REEDUCATION: CPT | Performed by: PHYSICAL THERAPIST

## 2025-03-19 NOTE — PROGRESS NOTES
"Daily Note     Today's date: 3/19/2025  Patient name: Gavin Morgan  : 1990  MRN: [id number]  Referring provider: Carmelo Ya PA-C  Dx:   Encounter Diagnosis     ICD-10-CM    1. S/P lumbar fusion  Z98.1       2. Chronic midline low back pain without sciatica  M54.50     G89.29                      Subjective: Pt states he had glute spasm/tightness the other day, but he performed seated piriformis stretch and it went away later that day.  He continues to report doing well.       Objective: See treatment diary below      Assessment: Patient without issue during or post session. He does tolerate more resistance during session and higher level intensity overall performed. No complaints of pain post session.        Plan: Continue per plan of care.      Precautions: h/o previous discectomy; s/p lumbar fusion 24       [email or web address]  Access Code: [email or web address]    POC expires Unit limit Auth Expiration date PT/OT/ST + Visit Limit?   25  99                              Visit/Unit Tracking  AUTH Status:  Date 2/19 2/26 3/3 3/5 3/10  3/12 3/17 3/19   2/11 2/14 2/17   N/a  Used 13 14 15 16 17  18 19 20  10 11 12    Remaining                          Manuals 2/17 2/19 2/26  3/3 3/5 3/10  3/12 3/17 3/19                                                         Neuro Re-Ed                          DLS w bolster push in HL              Prone hip ext             Clamshell              TA SLR             Bird dog 3# UE/LE 3x10 3# UE/LE 3x10 3# UE/LE 3x10  C gold TB 3x10   C DB row 20# 3x12 C KB row 25# 3x10 Circuit #2 2# UE/LE 3x10     Paloff press Keis 16# 2x20 Keis 17# 2x20 Circuit: 25#  3 x 5   25# 3x10  Circuit #1: 3x10  Circuit #1 20# 1/2 kneel 3x10      Mini squat on overturned bosu ball              Low mat plank w hip abd taps              plank      3x20\"  Bear plank c KB drag 25# Bear plank c KB drag 25# 3x10 Circuit # 3: Bear plank c KB drag 25# 3x10    Bridge c  " "bridge 2x10           Mod side plank c clamshell             Dead bug 3# UE & 3# LE 3# UE/LE 3x12           Plank c shoulder taps 20\" box 3x10 Low mat 3x10 Low mat 3x10  Low mat 3x10 Circuit # 2: low mat 3x10   Plank c KBHE circuit #1: 3x10 Plank c KBHE circuit #1: 3x10 Plank c KBHE circuit #3: 3x10    superman    5\"x10 Circuit #2 3x10  Circuit #2: 3x10  Circuit #2 5\" 3x10 Circuit #2: 3x10\"x10  Circuit #2: 2# UE/LE 3x10     Ther Ex             Pt Edu  AF  KS  & re-eval     KS    TM 10' 10' 10'  10' 10'  10'  10' 10' 10'     HS stretch w strap      5x20\" ea  3x20\" ea    At step 5x20\" ea     HS stretch at step       2x20\" ea        S/l hip abd             Standing hip abd              Kneeling hip flexor stretch              PB squat             Manual BLE stretching- HS, piri, add              Supine sciatic  nerve glide         Circuit #2: 3x10     Ther Activity             Box lift: floor to low mat    Circuit 25# 3x5   Circuit #3: 20# 10+ 5 +5  Circuit #1: 10+10+5        Box carry    Circuit 25# 3x5 laps  Circuit 5x1 lap Circuit #3: 2 laps x3  Circuit #2: 1+5 +5        U/l farmer's carry 25# x10 25# 5 laps  Circuit 30# bucket 5x1 lap ea Circuit #3: 30# 2 laps x 3   Circuit #1:  40# bucket 2 laps x3 Circuit #2: 40# bucket 2 laps x3     1/2 kneel chops 10# 2x10 10# 2x10 10# 2x10 ea Circuit PMB 5x10   Circuit #2 keis 15# 3x10 Circuit #1 c rotation keis 10# 3x10     Kesier side step   Circuit 25# 3x5 ea          [place] retrogait    Circuit: 25# 3x5   Circuit #1 25# 3x10  Circuit #1: 29#  3x10    Circuit # 1: 25# 3x10     Sled push & pull  90# 6 laps 100# 5 laps Circuit 100# 3x5 laps  Circuit 100# 5x1 lap Circuit #1 100# 120# x 3  Circuit #1: 60 ft x 1 + 5 +5   Circuit #2: 120# 75' 3x2 laps ea Circuit #1 120# 75' 3x2 laps ea Circuit #1 120# 80' x5    x3 reps     RDL 25# 3x10 25# 3x10 Home  25# 3x10 Circuit #2: 25# 3x10  Circuit #2: 15# 3x10  Circuit #1 25# 3x12 Circuit #2: 40# bucket 3x10 Circuit #1 25# KB 3x10     1/2 " kneel w outstretched arms      Circuit #1: Pink MB x5 ea side x 3  Circuit #1: Pink MB x10 ea side x 3                     Modalities

## 2025-03-24 ENCOUNTER — OFFICE VISIT (OUTPATIENT)
Dept: PHYSICAL THERAPY | Facility: CLINIC | Age: 35
End: 2025-03-24
Payer: OTHER MISCELLANEOUS

## 2025-03-24 DIAGNOSIS — G89.29 CHRONIC MIDLINE LOW BACK PAIN WITHOUT SCIATICA: ICD-10-CM

## 2025-03-24 DIAGNOSIS — M54.50 CHRONIC MIDLINE LOW BACK PAIN WITHOUT SCIATICA: ICD-10-CM

## 2025-03-24 DIAGNOSIS — Z98.1 S/P LUMBAR FUSION: Primary | ICD-10-CM

## 2025-03-24 PROCEDURE — 97530 THERAPEUTIC ACTIVITIES: CPT | Performed by: PHYSICAL THERAPIST

## 2025-03-24 PROCEDURE — 97112 NEUROMUSCULAR REEDUCATION: CPT | Performed by: PHYSICAL THERAPIST

## 2025-03-24 PROCEDURE — 97110 THERAPEUTIC EXERCISES: CPT | Performed by: PHYSICAL THERAPIST

## 2025-03-24 NOTE — PROGRESS NOTES
"Daily Note     Today's date: 3/24/2025  Patient name: Gavin Morgan  : 1990  MRN: [id number]  Referring provider: Carmelo Ya PA-C  Dx:   Encounter Diagnosis     ICD-10-CM    1. S/P lumbar fusion  Z98.1       2. Chronic midline low back pain without sciatica  M54.50     G89.29                      Subjective: Pt states he still has pain when picking up sticks in his yard. \"Any time I bend, it takes me a minute to stand up straight.\"  He states he's able to plank now without pain.       Objective: See treatment diary below      Assessment: Patient able to progress higher level strengthening and functional activity.  Verbal and postural cueing to prevent forward lean during functional activity. For example, he is shown a lunge instead or 1/2 knee and is encouraged to engage core and keep back flat.  No pain during or post session.  He is able to lift 50# box this visit to mimic work tasks.        Plan: Continue per plan of care.      Precautions: h/o previous discectomy; s/p lumbar fusion 24       [email or web address]  Access Code: [email or web address]    POC expires Unit limit Auth Expiration date PT/OT/ST + Visit Limit?   25  99                              Visit/Unit Tracking  AUTH Status:  Date 2/19 2/26 3/3 3/5 3/10  3/12 3/17 3/19  3/24   2/17   N/a  Used 13 14 15 16 17  18 19 20 21   12    Remaining                          Manuals 2/17 2/19 2/26  3/3 3/5 3/10  3/12 3/17 3/19  3/24                                                       Neuro Re-Ed                          DLS w bolster push in HL              Prone hip ext             Clamshell              TA SLR             Bird dog 3# UE/LE 3x10 3# UE/LE 3x10 3# UE/LE 3x10  C gold TB 3x10   C DB row 20# 3x12 C KB row 25# 3x10 Circuit #2 2# UE/LE 3x10     Paloff press Keis 16# 2x20 Keis 17# 2x20 Circuit: 25#  3 x 5   25# 3x10  Circuit #1: 3x10  Circuit #1 20# 1/2 kneel 3x10      Mini squat on overturned bosu ball            " "  Low mat plank w hip abd taps              plank      3x20\"  Bear plank c KB drag 25# Bear plank c KB drag 25# 3x10 Circuit # 3: Bear plank c KB drag 25# 3x10 Circuit # 2: Bear plank c KB drag 25# 3x10   Bridge c march  SL bridge 2x10           Mod side plank c clamshell             Dead bug 3# UE & 3# LE 3# UE/LE 3x12           Plank c shoulder taps 20\" box 3x10 Low mat 3x10 Low mat 3x10  Low mat 3x10 Circuit # 2: low mat 3x10   Plank c KBHE circuit #1: 3x10 Plank c KBHE circuit #1: 3x10 Plank c KBHE circuit #3: 3x10 Plank c KBHE  3x10   superman    5\"x10 Circuit #2 3x10  Circuit #2: 3x10  Circuit #2 5\" 3x10 Circuit #2: 3x10\"x10  Circuit #2: 2# UE/LE 3x10     Plank on bosu ball walk overs           Circuit #1: X10 3 sets                 Ther Ex             Pt Edu  AF  KS  & re-eval     KS KS   TM 10' 10' 10'  10' 10'  10'  10' 10' 10'  10'   HS stretch w strap      5x20\" ea  3x20\" ea    At step 5x20\" ea     HS stretch at step       2x20\" ea        S/l hip abd             Standing hip abd              Kneeling hip flexor stretch              PB squat             Manual BLE stretching- HS, piri, add              Supine sciatic  nerve glide         Circuit #2: 3x10     Ther Activity             Box lift: floor to low mat    Circuit 25# 3x5   Circuit #3: 20# 10+ 5 +5  Circuit #1: 10+10+5     Circuit #2 box + 40# x10 3 sets   Box carry    Circuit 25# 3x5 laps  Circuit 5x1 lap Circuit #3: 2 laps x3  Circuit #2: 1+5 +5     Circuit #2 box + 40#: 20 ft x 5   3 sets    U/l farmer's carry 25# x10 25# 5 laps  Circuit 30# bucket 5x1 lap ea Circuit #3: 30# 2 laps x 3   Circuit #1:  40# bucket 2 laps x3 Circuit #2: 40# bucket 2 laps x3     1/2 kneel chops 10# 2x10 10# 2x10 10# 2x10 ea Circuit PMB 5x10   Circuit #2 keis 15# 3x10 Circuit #1 c rotation keis 10# 3x10     Kesier side step   Circuit 25# 3x5 ea          [place] retrogait    Circuit: 25# 3x5   Circuit #1 25# 3x10  Circuit #1: 29#  3x10    Circuit # 1: 25# 3x10     Sled " push & pull  90# 6 laps 100# 5 laps Circuit 100# 3x5 laps  Circuit 100# 5x1 lap Circuit #1 100# 120# x 3  Circuit #1: 60 ft x 1 + 5 +5   Circuit #2: 120# 75' 3x2 laps ea Circuit #1 120# 75' 3x2 laps ea Circuit #1 120# 80' x5    x3 reps  Circuit #1 120# x5 laps x 3 sets    RDL 25# 3x10 25# 3x10 Home  25# 3x10 Circuit #2: 25# 3x10  Circuit #2: 15# 3x10  Circuit #1 25# 3x12 Circuit #2: 40# bucket 3x10 Circuit #1 25# KB 3x10     1/2 kneel w outstretched arms      Circuit #1: Pink MB x5 ea side x 3  Circuit #1: Pink MB x10 ea side x 3        1/2 knee vs. Lunge to weight  at diagonal           Circuit #1: 5# x10 3 sets    Modalities

## 2025-03-26 ENCOUNTER — OFFICE VISIT (OUTPATIENT)
Dept: PHYSICAL THERAPY | Facility: CLINIC | Age: 35
End: 2025-03-26
Payer: OTHER MISCELLANEOUS

## 2025-03-26 DIAGNOSIS — M54.50 CHRONIC MIDLINE LOW BACK PAIN WITHOUT SCIATICA: ICD-10-CM

## 2025-03-26 DIAGNOSIS — G89.29 CHRONIC MIDLINE LOW BACK PAIN WITHOUT SCIATICA: ICD-10-CM

## 2025-03-26 DIAGNOSIS — Z98.1 S/P LUMBAR FUSION: Primary | ICD-10-CM

## 2025-03-26 PROCEDURE — 97530 THERAPEUTIC ACTIVITIES: CPT

## 2025-03-26 PROCEDURE — 97112 NEUROMUSCULAR REEDUCATION: CPT

## 2025-03-26 PROCEDURE — 97110 THERAPEUTIC EXERCISES: CPT

## 2025-03-26 NOTE — PROGRESS NOTES
"Daily Note     Today's date: 3/26/2025  Patient name: Gavin Morgan  : 1990  MRN: [id number]  Referring provider: Carmelo Ya PA-C  Dx:   Encounter Diagnosis     ICD-10-CM    1. S/P lumbar fusion  Z98.1       2. Chronic midline low back pain without sciatica  M54.50     G89.29                      Subjective: Pt notes some lumbar soreness yesterday but reports it is better today.      Objective: See treatment diary below      Assessment: Continues to progress well.  Demonstrates improved lunge and squat form.  Still requiring occasional VC to facilitate slower pace and proper form during core stabilization.  Performs work related activities without c/o pain or discomfort.      Plan: Continue per plan of care.      Precautions: h/o previous discectomy; s/p lumbar fusion 24       [email or web address]  Access Code: [email or web address]    POC expires Unit limit Auth Expiration date PT/OT/ST + Visit Limit?   25  99                              Visit/Unit Tracking  AUTH Status:  Date 2/19 2/26 3/3 3/5 3/10  3/12 3/17 3/19  3/24 3/26  2/17   N/a  Used 13 14 15 16 17  18 19 20 21 22  12    Remaining                          Manuals 3/26  2/26  3/3 3/5 3/10  3/12 3/17 3/19  3/24                                                       Neuro Re-Ed                          DLS w bolster push in HL              Prone hip ext             Clamshell              TA SLR             Bird dog Circuit #3: C kb row 20# 3x20   3# UE/LE 3x10  C gold TB 3x10   C DB row 20# 3x12 C KB row 25# 3x10 Circuit #2 2# UE/LE 3x10     Paloff press   Circuit: 25#  3 x 5   25# 3x10  Circuit #1: 3x10  Circuit #1 20# 1/2 kneel 3x10      Mini squat on overturned bosu ball              Low mat plank w hip abd taps              plank Circuit #1:  bear plank KB drag 25# 3x10     3x20\"  Bear plank c KB drag 25# Bear plank c KB drag 25# 3x10 Circuit # 3: Bear plank c KB drag 25# 3x10 Circuit # 2: Bear plank c KB drag 25# 3x10 " "  Bridge c march             Mod side plank c clamshell             Dead bug             Plank c shoulder taps   Low mat 3x10  Low mat 3x10 Circuit # 2: low mat 3x10   Plank c KBHE circuit #1: 3x10 Plank c KBHE circuit #1: 3x10 Plank c KBHE circuit #3: 3x10 Plank c KBHE  3x10   superman    5\"x10 Circuit #2 3x10  Circuit #2: 3x10  Circuit #2 5\" 3x10 Circuit #2: 3x10\"x10  Circuit #2: 2# UE/LE 3x10     Plank on bosu ball walk overs  Circuit #3: 3x10         Circuit #1: X10 3 sets                 Ther Ex             Pt Edu    KS  & re-eval     KS KS   TM   10'  10' 10'  10'  10' 10' 10'  10'   HS stretch w strap      5x20\" ea  3x20\" ea    At step 5x20\" ea     HS stretch at step       2x20\" ea        S/l hip abd             Standing hip abd              Kneeling hip flexor stretch              PB squat             Manual BLE stretching- HS, piri, add              Supine sciatic  nerve glide         Circuit #2: 3x10     Ther Activity             Box lift: floor to low mat  Circuit #2: box+ 40# 3x10  Circuit 25# 3x5   Circuit #3: 20# 10+ 5 +5  Circuit #1: 10+10+5     Circuit #2 box + 40# x10 3 sets   Box carry  Circuit #2: box + 40# 20'x3 x3 set  Circuit 25# 3x5 laps  Circuit 5x1 lap Circuit #3: 2 laps x3  Circuit #2: 1+5 +5     Circuit #2 box + 40#: 20 ft x 5   3 sets    U/l farmer's carry    Circuit 30# bucket 5x1 lap ea Circuit #3: 30# 2 laps x 3   Circuit #1:  40# bucket 2 laps x3 Circuit #2: 40# bucket 2 laps x3     1/2 kneel chops   10# 2x10 ea Circuit PMB 5x10   Circuit #2 keis 15# 3x10 Circuit #1 c rotation keis 10# 3x10     Kesier side step   Circuit 25# 3x5 ea          [place] retrogait    Circuit: 25# 3x5   Circuit #1 25# 3x10  Circuit #1: 29#  3x10    Circuit # 1: 25# 3x10     Sled push & pull    Circuit 100# 3x5 laps  Circuit 100# 5x1 lap Circuit #1 100# 120# x 3  Circuit #1: 60 ft x 1 + 5 +5   Circuit #2: 120# 75' 3x2 laps ea Circuit #1 120# 75' 3x2 laps ea Circuit #1 120# 80' x5    x3 reps  Circuit #1 120# x5 " laps x 3 sets    RDL   Home  25# 3x10 Circuit #2: 25# 3x10  Circuit #2: 15# 3x10  Circuit #1 25# 3x12 Circuit #2: 40# bucket 3x10 Circuit #1 25# KB 3x10     1/2 kneel w outstretched arms  Circuit #2 rope pull 55# 3x2    Circuit #1: Pink MB x5 ea side x 3  Circuit #1: Pink MB x10 ea side x 3        1/2 knee vs. Lunge to weight  at diagonal  Walking lunge c KB circuit 1: 3x2x6 20# kb         Circuit #1: 5# x10 3 sets    Squat c OH lift Circuit #1:  20# KB 3x10            Modalities